# Patient Record
Sex: FEMALE | Race: BLACK OR AFRICAN AMERICAN | Employment: FULL TIME | URBAN - METROPOLITAN AREA
[De-identification: names, ages, dates, MRNs, and addresses within clinical notes are randomized per-mention and may not be internally consistent; named-entity substitution may affect disease eponyms.]

---

## 2017-04-21 ENCOUNTER — HOSPITAL ENCOUNTER (OUTPATIENT)
Dept: ULTRASOUND IMAGING | Age: 62
Discharge: HOME | End: 2017-04-21
Attending: NURSE PRACTITIONER
Payer: COMMERCIAL

## 2017-04-21 DIAGNOSIS — R31.29 MICROSCOPIC HEMATURIA: ICD-10-CM

## 2017-04-21 PROCEDURE — 76770 US EXAM ABDO BACK WALL COMP: CPT

## 2021-11-03 ENCOUNTER — APPOINTMENT (OUTPATIENT)
Dept: PHYSICAL THERAPY | Age: 66
End: 2021-11-03

## 2021-11-10 ENCOUNTER — HOSPITAL ENCOUNTER (OUTPATIENT)
Dept: PHYSICAL THERAPY | Age: 66
Discharge: HOME | End: 2021-11-10
Payer: COMMERCIAL

## 2021-11-10 PROCEDURE — 97110 THERAPEUTIC EXERCISES: CPT

## 2021-11-10 PROCEDURE — 97140 MANUAL THERAPY 1/> REGIONS: CPT

## 2021-11-10 PROCEDURE — 97162 PT EVAL MOD COMPLEX 30 MIN: CPT

## 2021-11-10 NOTE — PROGRESS NOTES
[address] [facility] AT [address], [address] [place] [address] Road  Phone: [phone]   [phone]  PLAN OF CARE / [address] [place] [address] PHYSICAL THERAPY SERVICES  Patient Name: Tevin Duvall : 1955   Medical   Diagnosis: Radiculopathy, lumbosacral region [M54.17] Treatment Diagnosis: Radiculopathy, lumbosacral region [M54.17]   Right hip pain  [M25.551]   Onset Date: ~2 months prior     Referral Source: Caryl Chavez MD Start of Care [facility]): 11/10/2021   Prior Hospitalization: See medical history Provider #: [id number]   Prior Level of Function: I in all functional mobility   Comorbidities: HTN   Medications: Verified on Patient Summary List   The Plan of Care and following information is based on the information from the initial evaluation.   ===========================================================================================  Assessment / key information:  Pt is a 77 y.o. F who presents with Lumbosacral radiculopathy as well as R hip pain likely due to piriformis syndrome. Current deficits include: dec hip and lumbar ROM, dec hip and core dynamic stability, dec hip strength, dec balance. Functional deficits include: impaired squatting, lifting, and bending. FOTO score indicates 39% functional impairment. Home exercise program initiated on initial evaluation to address these deficits. Pt would benefit from PT to address these deficits for increased functional mobility and QOL. ADRIEL: Pt reprots that her back started bothering her a over 2 months ago after mowing the lawn. She and her  were ill with covid back in April which she feels took a toll on her body. The pain began after pushing/pulling and turning with the . Initially the pain was only at her Right hip, then it progressed to the thigh, then the calf and foot.  [facility] and [facility] flexeril and methlyprednisone which helped but as soon as she stopped taking it the pain came back. [facility] and she was prescribed another round of steriods and robaxin which has again helped but has not resolved the pain. She was then sent to physical therapy. She describes the pain as a soreness at her hip, and she has numbness in her R big toe. The pain is worse with heavy lifting, squatting, and bending. It is also worse with heat. It is better with light activity. Prior to this injury she has not had any hx of low back or hip pain and was able to do all of her typical activities including yard work without pain, limitations, or problems. Pt denies all red flags. OBJECTIVE:  PAIN:  Location- low back, RLE  Current- 1/10  Best-0/10  Worst-9/10  Alleviating factors:light activity  Aggravating factors: heavy lifting, squatting, bending, heat    MMT:  Hip   -flex L=3+/5 R=3+/5  -ext L=3/5 R=3/5  -abd L=3+/5 R=3/5p! Knee  -flex L=3+/5 R=3+/5  -ext L=4/5 R=3+/5  Ankle  - DF L=4/5 R=3/5    Sensation: pt reports sensory deficits in L4 distribution along dorsal aspect of R great toe    Posture: grossly WNL    Balance: SLS 10s ARTEMIO with inc postural sway and hip IR with R SLS    AROM:  Lumbar  Flexion- finger tips to mid shin (75% ROM) tightness sensation  Extension- 50% ROM, mild pain  Rotation R- 75% ROM, tightness sensation, mild pain  Rotation L- 75% ROM, tightness sensation  Sidebend R- WNL  Sidebend L- 75% ROM, tightness sensation on R  Hip  Flexion- L=80deg, R=60deg p!   Extension- L=12deg, R= 0 deg  Piriformis (+) artemio for tightness R>L    Outcome Measure: FOTO=61    Palpation for Tenderness: TTP of R piriformis, SI joint, glute max, glute med, distal multifidus    PIVM: No reproduction of pain with P/A pressure from L1-5    Special Tests:  Supine to long sit- negative  SLR- positive on R  Slump Test- positive on R  Elys positive ARTEMIO R>L    ===========================================================================================  Eval Complexity: History MEDIUM  Complexity : 1-2 comorbidities / personal factors will impact the outcome/ POC ;  Examination  MEDIUM Complexity : 3 Standardized tests and measures addressing body structure, function, activity limitation and / or participation in recreation ; Presentation MEDIUM Complexity : Evolving with changing characteristics ; Decision Making MEDIUM Complexity : FOTO score of 26-74; Overall Complexity MEDIUM  Problem List: pain affecting function, decrease ROM, decrease strength, edema affecting function, impaired gait/ balance, decrease ADL/ functional abilitiies, decrease activity tolerance, decrease flexibility/ joint mobility and decrease transfer abilities   Treatment Plan may include any combination of the following: Therapeutic exercise, Therapeutic activities, Neuromuscular re-education, Physical agent/modality, Gait/balance training, Manual therapy, Patient education, Self Care training, Functional mobility training, Home safety training and Stair training  Patient / Family readiness to learn indicated by: asking questions  Persons(s) to be included in education: patient (P)  Barriers to Learning/Limitations: None  Measures taken, if barriers to learning:    Patient Goal (s): \"manage discomfort without medication\"   Patient self reported health status: good  Rehabilitation Potential: good  Short Term Goals: To be accomplished in 1 weeks:  1) Goal: Patient will be independent and compliant with HEP in order to progress toward long term goals. Status at last note/certification: issued and reviewed  Long Term Goals: To be accomplished in 8-12 treatments:  1) Goal: Patient will improve FOTO assessment score to 74 pts in order to indicate improved functional abilities.   Status at last note/certification: 61 pts  2) Goal: Patient will improve ARTEMIO hip extension to 20 deg for improved posture and gait mechanics  Status at last note/certification: [id number], R= 0 deg  3) Goal: Patient will report worst R hip/LE as 2/10 or less in order to progress toward personal goals. Status at last note/certification: 9/65  4) Goal: Patient will report overall at least 65% improvement in function in order to progress toward premorbid status. Status at last note/certification: n/a  5) Goal: Patient will demonstrate ability to squat to lift 20lbs with proper mechanics for improved ability to perform ADLs   Status at last note/certification: unable to squat or perform heavy lifting. Frequency / Duration:   Patient to be seen  1-2  times per week for 4-6  weeks: All LTG as above will be assessed and updated every 10 visits or 30 days and progressed as needed    Patient / Caregiver education and instruction: exercises  Therapist Signature: Rudolph Vale Date: 68/96/2146   Certification Period: na Time: 8:41 AM   ===========================================================================================  I certify that the above Physical Therapy Services are being furnished while the patient is under my care. I agree with the treatment plan and certify that this therapy is necessary. Physician Signature:        Date:       Time:        Tay Gonzalez MD  Please sign and return to [facility] AT [facility] or you may fax the signed copy to [phone]. Thank you.

## 2021-11-10 NOTE — PROGRESS NOTES
PT DAILY TREATMENT NOTE     Patient Name: Nato Watson  Date:11/10/2021  : 1955  [x]  Patient  Verified  Payor: BLUE CROSS / [facility] [place] [facility] / Product Type: PPO /    In time:8:35  Out time: 9:29a  Total Treatment Time (min): 54  Visit #: 1 of     Medicare/BCBS Only   Total Timed Codes (min):  23 1:1 Treatment Time:  54       Treatment Area: Radiculopathy, lumbosacral region [M54.17]    SUBJECTIVE  Pain Level (0-10 scale): 0  Any medication changes, allergies to medications, adverse drug reactions, diagnosis change, or new procedure performed?: [x] No    [] Yes (see summary sheet for update)  Subjective functional status/changes:   [] No changes reported  Pt initial eval see POC for details    OBJECTIVE    31 min [x]Eval                  []Re-Eval       10 min Therapeutic Exercise:  [] See flow sheet :   Rationale: increase ROM and increase strength to improve the patient's ability to squat    13 min Manual Therapy:  stm to R piriformis, manual hip flexor, ER/IR stretching   The manual therapy interventions were performed at a separate and distinct time from the therapeutic activities interventions. Rationale: decrease pain, increase ROM and increase tissue extensibility to improve tissue excursion during functional mobility and ADLs              With   [] TE   [] TA   [] neuro   [] other: Patient Education: [x] Review HEP    [] Progressed/Changed HEP based on:   [] positioning   [] body mechanics   [] transfers   [] heat/ice application    [] other:      Other Objective/Functional Measures:    Justification for Eval Code Complexity:  Patient History : see POC   Examination see exam   Clinical Presentation: evolving  Clinical Decision Making : FOTO : 61/100    Pain Level (0-10 scale) post treatment: 0    ASSESSMENT/Changes in Function: Pt was instructed in initial HEP required demo, vc, and tc for all exercises to perform correctly.  Pt was given hand out detailing exercises and instructed in modification of exercises to tolerance, and in performing exercises safely. Pt verbalized understanding. Patient will continue to benefit from skilled PT services to modify and progress therapeutic interventions, address functional mobility deficits, address ROM deficits, address strength deficits, analyze and address soft tissue restrictions, analyze and cue movement patterns, analyze and modify body mechanics/ergonomics, assess and modify postural abnormalities, address imbalance/dizziness and instruct in home and community integration to attain remaining goals. []  See Plan of Care  []  See progress note/recertification  []  See Discharge Summary         Progress towards goals / Updated goals:  No progress as goals were set today    PLAN  []  Upgrade activities as tolerated     []  Continue plan of care  []  Update interventions per flow sheet       []  Discharge due to:_  []  Other:_        Arturo Florez 11/10/2021  9:08 AM    No future appointments.

## 2021-11-11 ENCOUNTER — HOSPITAL ENCOUNTER (OUTPATIENT)
Dept: PHYSICAL THERAPY | Age: 66
End: 2021-11-11
Payer: COMMERCIAL

## 2021-11-15 ENCOUNTER — APPOINTMENT (OUTPATIENT)
Dept: PHYSICAL THERAPY | Age: 66
End: 2021-11-15
Payer: COMMERCIAL

## 2021-11-17 ENCOUNTER — HOSPITAL ENCOUNTER (OUTPATIENT)
Dept: PHYSICAL THERAPY | Age: 66
Discharge: HOME | End: 2021-11-17
Payer: COMMERCIAL

## 2021-11-17 PROCEDURE — 97140 MANUAL THERAPY 1/> REGIONS: CPT

## 2021-11-17 PROCEDURE — 97110 THERAPEUTIC EXERCISES: CPT

## 2021-11-17 NOTE — PROGRESS NOTES
PHYSICAL THERAPY - DAILY TREATMENT NOTE    Patient Name: Alexandr Arauz        Date: 2021  : 1955   yes Patient  Verified  Visit #:   2     Insurance: Payor: Arelis Hernández / Plan: [place] PART A & B / Product Type: Medicare /      In time: 7:00 Out time: 7:55   Total Treatment Time: 55     Medicare/BCBS Time Tracking (below)   Total Timed Codes (min):  51 1:1 Treatment Time:  51     TREATMENT AREA =  Radiculopathy, lumbosacral region [M54.17]    SUBJECTIVE  Pain Level (on 0 to 10 scale):  0  / 10   Medication Changes/New allergies or changes in medical history, any new surgeries or procedures?    no  If yes, update Summary List   Subjective Functional Status/Changes:  []  No changes reported     Pt reports gradual reduction in pain recently. She is no longer taking pain medication. She has only minimal, intermittent pain or numbness to RLE/toe. Pain tends to hit to R low back/buttock        OBJECTIVE    27 min Therapeutic Exercise:  [x]  See flow sheet   Rationale:    increase ROM and increase strength to improve the patient's ability to squat    10 min Manual Therapy: stm to R piriformis, R>L l/s paraspinals with DTM to L5 multifidus. manual quad, ER stretching   Rationale:      decrease pain, increase ROM and increase tissue extensibility to improve tissue excursion during functional mobility and ADLs  The manual therapy interventions were performed at a separate and distinct time from the therapeutic activities interventions.     7 min Therapeutic Activity: [x]  See flow sheet  -bridge, side stepping   Rationale:   to improve functional activities, model real life movements and performance specific to the patient's need with supervision to return the patient to their PLOF       7 min Neuromuscular Re-ed: [x]  See flow sheet  -pallof   Rationale:    improve coordination, improve balance and increase proprioception to improve the patients ability to dynamically stabilize l/s with functional mobility and ADL's      Billed With/As:   [x] TE   [] TA   [] Neuro   [] Self Care Patient Education: [x] Review HEP    [] Progressed/Changed HEP based on:   [] positioning   [] body mechanics   [] transfers   [] heat/ice application    [x] other: updated HEP     Other Objective/Functional Measures:    -new exercises added as per flow sheet with vc's provided for form/technique 100% of the time. -cues to increase hip excursion with supine bridge; pt denies pain; limited to 50% 2/2 weakness and notes \"tight\" to b/l quads. Post Treatment Pain Level (on 0 to 10) scale:   0  / 10     ASSESSMENT  Assessment/Changes in Function:     Pt noted to have mild/mod tightness to b/l quad/psoas with dale stretching. Pt reports min increase in R great toe numbness after treatment; possibly related to piriformis manual therapy as she did not note any peripheralization with exercises. She did have a lot of \"soreness\" with STM to R L5 multifidus. Will assess treatment response NV with appropriate modifications/adjustments as indicated. Pt ed on need for compliance with HEP for progressive core strengthening. []  See Progress Note/Recertification   Patient will continue to benefit from skilled PT services to modify and progress therapeutic interventions, address functional mobility deficits, address ROM deficits, address strength deficits, analyze and address soft tissue restrictions, analyze and cue movement patterns, analyze and modify body mechanics/ergonomics, assess and modify postural abnormalities, address imbalance/dizziness and instruct in home and community integration to attain remaining goals. Progress toward goals / Updated goals:    Short Term Goals: To be accomplished in 1 weeks:  1) Goal: Patient will be independent and compliant with HEP in order to progress toward long term goals. Status at last note/certification: issued and reviewed. -11/17: goal ongoing; performing ~4 x/wk.   Updated HEP today  Long Term Goals: To be accomplished in 8-12 treatments:  1) Goal: Patient will improve FOTO assessment score to 74 pts in order to indicate improved functional abilities. Status at last note/certification: 61 pts  2) Goal: Patient will improve ARTEMIO hip extension to 20 deg for improved posture and gait mechanics  Status at last note/certification: [id number], R= 0 deg  3) Goal: Patient will report worst R hip/LE as 2/10 or less in order to progress toward personal goals. Status at last note/certification: 2/37  4) Goal: Patient will report overall at least 65% improvement in function in order to progress toward premorbid status. Status at last note/certification: n/a  5) Goal: Patient will demonstrate ability to squat to lift 20lbs with proper mechanics for improved ability to perform ADLs   Status at last note/certification: unable to squat or perform heavy lifting.         PLAN  []  Upgrade activities as tolerated yes Continue plan of care   []  Discharge due to :    []  Other:      Therapist: Satinder Yuen.  Luz Juarez, PT    Date: 11/17/2021 Time: 6:57 AM     Future Appointments   Date Time Provider Parish Aguero   11/22/2021  7:00 AM Rivera Hogan, PT [facility]   11/30/2021  3:30 PM Louise Abdul PTA [facility]

## 2021-11-22 ENCOUNTER — APPOINTMENT (OUTPATIENT)
Dept: PHYSICAL THERAPY | Age: 66
End: 2021-11-22
Payer: COMMERCIAL

## 2021-11-24 ENCOUNTER — APPOINTMENT (OUTPATIENT)
Dept: PHYSICAL THERAPY | Age: 66
End: 2021-11-24
Payer: COMMERCIAL

## 2021-11-24 ENCOUNTER — HOSPITAL ENCOUNTER (OUTPATIENT)
Dept: PHYSICAL THERAPY | Age: 66
Discharge: HOME | End: 2021-11-24
Payer: COMMERCIAL

## 2021-11-24 PROCEDURE — 97110 THERAPEUTIC EXERCISES: CPT

## 2021-11-24 PROCEDURE — 97140 MANUAL THERAPY 1/> REGIONS: CPT

## 2021-11-24 NOTE — PROGRESS NOTES
PHYSICAL THERAPY - DAILY TREATMENT NOTE    Patient Name: Shauna Munoz        Date: 2021  : 1955   yes Patient  Verified  Visit #:   4     [facility] / Plan: [place] PART A & B / Product Type: Medicare /      In time: 6:59 Out time: 7:58   Total Treatment Time: 59     Medicare/BCBS Time Tracking (below)   Total Timed Codes (min):  55 1:1 Treatment Time:  55     TREATMENT AREA =  Radiculopathy, lumbosacral region [M54.17]    SUBJECTIVE  Pain Level (on 0 to 10 scale):  0  / 10   Medication Changes/New allergies or changes in medical history, any new surgeries or procedures?    no  If yes, update Summary List   Subjective Functional Status/Changes:  []  No changes reported     Pt states she had increased soreness after the manual therapy last session, but then she felt a little better after it wore off. She reports her numbness to R great toe is better as it is localized to just the great toe whereas before it had included 1st MT.       OBJECTIVE    45 min Therapeutic Exercise:  [x]  See flow sheet (-4 min TM)   Rationale:    increase ROM and increase strength to improve the patient's ability to squat    10 min Manual Therapy: stm to R piriformis, R>L l/s paraspinals with DTM to L5 multifidus. manual quad, ER stretching   Rationale:      decrease pain, increase ROM and increase tissue extensibility to improve tissue excursion during functional mobility and ADLs  The manual therapy interventions were performed at a separate and distinct time from the therapeutic activities interventions.         Billed With/As:   [x] TE   [] TA   [] Neuro   [] Self Care Patient Education: [x] Review HEP    [] Progressed/Changed HEP based on:   [] positioning   [] body mechanics   [] transfers   [] heat/ice application    [x] other: updated HEP     Other Objective/Functional Measures:    -increased reps with clams, s/l abd  -progressed TA march to   -added modified side planks  -performed fig 4 bridges x 5 reps no issues reported   Post Treatment Pain Level (on 0 to 10) scale:   0  / 10     ASSESSMENT  Assessment/Changes in Function:     Pt demonstrates good improvement in pain as per LTG #3 met today. Pt also demonstrating slow, steady increase in glute/core strength as per ability to advance with reps/exercises as per flow sheet. Pt did note increased N/T to R great toe after s/l hip abd; ed to take rest between sets. Plan to continue progressing hip/core strength for decreased pain/radicular sx's RLE. []  See Progress Note/Recertification   Patient will continue to benefit from skilled PT services to modify and progress therapeutic interventions, address functional mobility deficits, address ROM deficits, address strength deficits, analyze and address soft tissue restrictions, analyze and cue movement patterns, analyze and modify body mechanics/ergonomics, assess and modify postural abnormalities, address imbalance/dizziness and instruct in home and community integration to attain remaining goals. Progress toward goals / Updated goals:    Short Term Goals: To be accomplished in 1 weeks:  1) Goal: Patient will be independent and compliant with HEP in order to progress toward long term goals. Status at last note/certification: issued and reviewed. -11/17: goal ongoing; performing ~4 x/wk. Updated HEP today    [address] be accomplished in 8-12 treatments:  1) Goal: Patient will improve FOTO assessment score to 74 pts in order to indicate improved functional abilities. Status at last note/certification: 61 pts  2) Goal: Patient will improve ARTEMIO hip extension to 20 deg for improved posture and gait mechanics  Status at last note/certification: [id number], R= 0 deg  3) Goal: Patient will report worst R hip/LE as 2/10 or less in order to progress toward personal goals.   Status at last note/certification: 5/30. -11/24: goal met; pt reports max pain 0/10  4) Goal: Patient will report overall at least 65% improvement in function in order to progress toward premorbid status. Status at last note/certification: n/a  5) Goal: Patient will demonstrate ability to squat to lift 20lbs with proper mechanics for improved ability to perform ADLs   Status at last note/certification: unable to squat or perform heavy lifting.         PLAN  []  Upgrade activities as tolerated yes Continue plan of care   []  Discharge due to :    []  Other:      Therapist: Roberto Brown.  Rogelio Moreno, PT    Date: 11/24/2021 Time: 8:17 AM      Future Appointments   Date Time Provider Parish Aguero   11/30/2021  2:45 PM Domenica Alfaro [facility]

## 2021-11-30 ENCOUNTER — APPOINTMENT (OUTPATIENT)
Dept: PHYSICAL THERAPY | Age: 66
End: 2021-11-30
Payer: COMMERCIAL

## 2021-12-02 ENCOUNTER — HOSPITAL ENCOUNTER (OUTPATIENT)
Dept: PHYSICAL THERAPY | Age: 66
Discharge: HOME | End: 2021-12-02
Payer: COMMERCIAL

## 2021-12-02 PROCEDURE — 97110 THERAPEUTIC EXERCISES: CPT

## 2021-12-02 PROCEDURE — 97140 MANUAL THERAPY 1/> REGIONS: CPT

## 2021-12-02 NOTE — PROGRESS NOTES
PHYSICAL THERAPY - DAILY TREATMENT NOTE    Patient Name: Nato Watson        Date: 2021  : 1955   yes Patient  Verified  Visit #:   4     Insurance: Payor: Falguni Hazel / Plan: [place] / Product Type: Medicare /      In time: 7:00 Out time: 7:45   Total Treatment Time: 45     Medicare/BCBS Time Tracking (below)   Total Timed Codes (min):  45 1:1 Treatment Time:  45     TREATMENT AREA =  Radiculopathy, lumbosacral region [M54.17]    SUBJECTIVE  Pain Level (on 0 to 10 scale):  0  / 10   Medication Changes/New allergies or changes in medical history, any new surgeries or procedures?    no  If yes, update Summary List   Subjective Functional Status/Changes:  []  No changes reported     I think that I am getting better, I mainly just feel a little bit of tenderness in my buttock and some numbness in my big toe. OBJECTIVE    33 1:1 min Therapeutic Exercise:  [x]  See flow sheet   Rationale:      increase ROM and increase strength to improve the patients ability to improve functional abilities      12 1:1 min Manual Therapy: Long axis R LE distraction, Instrument assisted soft tissue mobilization applied to R glut/posterolateral hip using GT-1 and manual piriformis stretching   Rationale:      decrease pain, increase ROM, increase tissue extensibility, decrease trigger points and increase postural awareness to improve patient's ability to improve functional mobility  The manual therapy interventions were performed at a separate and distinct time from the therapeutic activities interventions.     Billed With/As:   [] TE   [] TA   [] Neuro   [] Self Care Patient Education: [x] Review HEP    [] Progressed/Changed HEP based on:   [] positioning   [] body mechanics   [] transfers   [] heat/ice application    [] other:      Other Objective/Functional Measures:  Increased to 2 laps with mini band side steps and extensive cueing/demonstration for proper technique with various exercises today     Post Treatment Pain Level (on 0 to 10) scale:   0  / 10     ASSESSMENT  Assessment/Changes in Function:   Patient reports approximately 75% overall improvement from therapy since initial evaluation. Pt also presents with diminished symptoms with only c/o R glut tenderness as well as isolated great toe numbness/paresthesia since last treatment. Pt reported R glut tenderness and great toe numbness abolished after trial with [facility] today. Will continue to progress/advance patient within current POC as tolerated with monitoring symptoms. []  See Progress Note/Recertification   Patient will continue to benefit from skilled PT services to modify and progress therapeutic interventions, address functional mobility deficits, address ROM deficits, address strength deficits, analyze and address soft tissue restrictions, analyze and cue movement patterns, analyze and modify body mechanics/ergonomics, assess and modify postural abnormalities and instruct in home and community integration to attain remaining goals. Progress toward goals / Updated goals:  Short Term Goals: To be accomplished in 1 weeks:  1) Goal: Patient will be independent and compliant with HEP in order to progress toward long term goals. Status at last note/certification: issued and reviewed. -11/17: goal ongoing; performing ~4 x/wk. Updated HEP today     Long Term Goals: To be accomplished in 8-12 treatments:  1) Goal: Patient will improve FOTO assessment score to 74 pts in order to indicate improved functional abilities. Status at last note/certification: 22 EFO  2) Goal: Patient will improve ARTEMIO hip extension to 20 deg for improved posture and gait mechanics  Status at last note/certification: L=12deg, R= 0 deg  3) Goal: Patient will report worst R hip/LE as 2/10 or less in order to progress toward personal goals.   Status at last note/certification: 9/10. -11/24: goal met; pt reports max pain 0/10  4) Goal: Patient will report overall at least 65% improvement in function in order to progress toward premorbid status. 12/2/21: Met: Pt reports approximately 75% overall improvement since initial evaluation  Status at last note/certification: n/a  5) Goal: Patient will demonstrate ability to squat to lift 20lbs with proper mechanics for improved ability to perform ADLs   Status at last note/certification: unable to squat or perform heavy lifting.      PLAN  [x]  Upgrade activities as tolerated yes Continue plan of care   []  Discharge due to :    []  Other:      Therapist: Jason Keith, PTA    Date: 12/2/2021 Time: 6:59 AM     Future Appointments   Date Time Provider Parish Aguero   12/2/2021  7:00 AM Camara Lunch, PTA [facility]

## 2021-12-10 ENCOUNTER — HOSPITAL ENCOUNTER (OUTPATIENT)
Dept: PHYSICAL THERAPY | Age: 66
Discharge: HOME | End: 2021-12-10
Payer: COMMERCIAL

## 2021-12-10 PROCEDURE — 97140 MANUAL THERAPY 1/> REGIONS: CPT

## 2021-12-10 PROCEDURE — 97110 THERAPEUTIC EXERCISES: CPT

## 2021-12-10 NOTE — PROGRESS NOTES
PHYSICAL THERAPY - DAILY TREATMENT NOTE    Patient Name: Felicita Bridges        Date: 12/10/2021  : 1955   yes Patient  Verified  Visit #:   5   of     Insurance: Payor: Winifred Connelly / Plan: [place] MEDICARE PART A & B / Product Type: Medicare /      In time: 7:00 Out time: 7:46   Total Treatment Time: 46     Medicare/BCBS Time Tracking (below)   Total Timed Codes (min):  46 1:1 Treatment Time:  46     TREATMENT AREA =  Radiculopathy, lumbosacral region [M54.17]    SUBJECTIVE  Pain Level (on 0 to 10 scale):  0 / 10   Medication Changes/New allergies or changes in medical history, any new surgeries or procedures? yes  If yes, update Summary List   Subjective Functional Status/Changes:  []  No changes reported     My buttock and leg felt a lot better after you worked on me with those instruments the last time I was here, I think that did the trick. OBJECTIVE    38  1:1 min Therapeutic Exercise:  [x]  See flow sheet   Rationale:      increase ROM and increase strength to improve the patients ability to improve functional abilities     8  1:1 min Manual Therapy: Long axis R LE distraction, Instrument assisted soft tissue mobilization applied to R glut/posterolateral hip using GT-1   Rationale:      decrease pain, increase ROM, increase tissue extensibility, decrease trigger points and increase postural awareness to improve patient's ability to improve functional mobility   The manual therapy interventions were performed at a separate and distinct time from the therapeutic activities interventions.       Billed With/As:   [] TE   [] TA   [] Neuro   [] Self Care Patient Education: [x] Review HEP    [] Progressed/Changed HEP based on:   [] positioning   [] body mechanics   [] transfers   [] heat/ice application    [] other:      Other Objective/Functional Measures:       Post Treatment Pain Level (on 0 to 10) scale:   0  / 10     ASSESSMENT  Assessment/Changes in Function:   See Progress note/Physician update for full detailed progress towards established goals. [x]  See Progress Note/Recertification   Patient will continue to benefit from skilled PT services to modify and progress therapeutic interventions, address functional mobility deficits, address ROM deficits, address strength deficits, analyze and address soft tissue restrictions, analyze and cue movement patterns, analyze and modify body mechanics/ergonomics, assess and modify postural abnormalities and instruct in home and community integration to attain remaining goals. Progress toward goals / Updated goals:  See Progress note/Physician update for full detailed progress towards established goals.      PLAN  [x]  Upgrade activities as tolerated yes Continue plan of care   []  Discharge due to :    []  Other:      Therapist: Jona Veloz PTA    Date: 12/10/2021 Time: 7:00 AM     Future Appointments   Date Time Provider Parish Aguero   12/17/2021  7:00 AM Peter Bourgeois, HEIDI [facility]   12/22/2021  7:00 AM Irina Adkins, PT [facility]   12/29/2021  7:00 AM Devonte Becerra, PT [facility]

## 2021-12-10 NOTE — PROGRESS NOTES
[address] [facility] PHYSICAL THERAPY  [address], [facility], [address] - Phone: [phone]  Fax: [phone] OF CARE/RECERTIFICATION FOR PHYSICAL THERAPY          Patient Name: Sarwat Clayton : 1955   Treatment/Medical Diagnosis: Radiculopathy, lumbosacral region [M54.17]   Onset Date: ~2 months prior    Referral Source: Scott Villanueva MD Start of Care [facility]): 11/10/2021   Prior Hospitalization: See Medical History Provider #: [id number]   Prior Level of Function: I in all functional mobility   Comorbidities: HTN   Medications: Verified on Patient Summary List   Vis[facility] from [facility]: 5 Missed Visits: 0     Goal/Measure of Progress Goal Met? 1. Patient will be independent and compliant with HEP in order to progress toward long term goals. Status at last Eval: issued and reviewed Current Status: Met, pt reports independence and compliance with current HEP 1x/day yes   2. Patient will improve FOTO assessment score to 74 pts in order to indicate improved functional abilities. Status at last Eval: 61/100 Current Status: 63/100 progress   3. Patient will improve ARTEMIO hip extension to 20 deg for improved posture and gait mechanics   Status at last Eval:  L=12deg, R= 0 deg Current Status:  L=12deg, R= 8 deg progress     Goal/Measure of Progress Goal Met? 4. Patient will report worst R hip/LE as 2/10 or less in order to progress toward personal goals   Status at last Eval: 9/10 Current Status: 2/10 yes   5. Patient will report overall at least 65% improvement in function in order to progress toward premorbid status. Status at last Eval: n/a Current Status: 75% improvement reported yes   6. Patient will demonstrate ability to squat to lift 20lbs with proper mechanics for improved ability to perform ADLs    Status at last Eval: unable to squat or perform heavy lifting.   Current Status: Met with ability to demonstrate squat crate lifts from floor to bed with 20 lb crate yes     Key Functional Changes/Progress: Pt reports 75% overall improvement in symptoms since beginning care. Pt reports 2/10 max pain, 1-2/10 avg pain; notes pain is made worse with repetitive bending and heavy lifting type ADL's. Pt is making steady progress gaining LE/hip mobility as well as advancing with strengthening and lumbopelvic/core stabilization within current POC. These improvements are likely contributing to pt self reported functional improvements as well as improved FOTO score. Although pt is progressing, she has not yet met all long term goals. As such skilled care is justified in continuing. Improvements: Pt reports the most functional improvement with the ability to somewhat self manage symptoms with HEP as well as decreased intensity and frequency of pain/symptoms since initial evaluation. Remaining functional limitations: Increased limitations/pain/symptoms with repetitive bending and heavy lifting type ADL's. Objective measurements:  R hip strength measurements/MMT= Flexion= 4-/5; Abduction= 4/5; Extension= 3+/5  Hip Extension AROM= L=12 deg, R= 8 deg    FOTO 63/100    Problem List: pain affecting function, decrease ROM, decrease strength, impaired gait/ balance, decrease ADL/ functional abilitiies, decrease activity tolerance, decrease flexibility/ joint mobility and decrease transfer abilities     Treatment Plan may include any combination of the following: Therapeutic exercise, Therapeutic activities, Neuromuscular re-education, Physical agent/modality, Gait/balance training, Manual therapy, Patient education, Self Care training, Functional mobility training, Home safety training and Stair training  Patient Goal(s) has been updated and includes:  \"I want to be able to bend over and lift with less pain in my butt and hip. \"    Goals for this certification period include and are to be achieved in   7  treatments: 1. Patient will improve FOTO assessment score to 74 pts in order to indicate improved functional abilities. 2.Patient will improve ARTEMIO hip extension to 20 deg for improved posture and gait mechanics  3. Increase R hip strength by =/> 1/3 muscle grade with all measured planes for increased strength and endurance with standing/walking ADL's.   4. Pt will report =/> 75% reduction in pain/symptoms with repetitive bending and heavy lifting type ADL's for increased function with ADL's. Frequency / Duration:   Patient to be seen   1-2   times per week for   7    treatments:    Assessments/Recommendations: Continue with current POC for 7 remaining visits left on current script with advancing as tolerated, then reassess for the need for continuation or discharge from therapy. If you have any questions/comments please contact us directly at [phone]. Thank you for allowing us to assist in the care of your patient. Therapist Signature: HEIDI Ricci DPT Date: 25/23/9559   Certification Period:   NA Time: 7:04 AM   NOTE TO PHYSICIAN:  PLEASE COMPLETE THE ORDERS BELOW AND FAX TO   [facility]: [phone]  If you are unable to process this request in 24 hours please contact our office: [phone]    ___ I have read the above report and request that my patient continue as recommended.   ___ I have read the above report and request that my patient continue therapy with the following changes/special instructions: ________________________________________________   ___ I have read the above report and request that my patient be discharged from therapy.      Physician Signature:        Date:       Time:       Luís Mehta MD

## 2021-12-17 ENCOUNTER — HOSPITAL ENCOUNTER (OUTPATIENT)
Dept: PHYSICAL THERAPY | Age: 66
Discharge: HOME | End: 2021-12-17
Payer: COMMERCIAL

## 2021-12-17 PROCEDURE — 97140 MANUAL THERAPY 1/> REGIONS: CPT

## 2021-12-17 PROCEDURE — 97110 THERAPEUTIC EXERCISES: CPT

## 2021-12-17 NOTE — PROGRESS NOTES
PHYSICAL THERAPY - DAILY TREATMENT NOTE    Patient Name: Byron Room        Date: 2021  : 1955   yes Patient  Verified  Visit #:     Insurance: Payor: Akua Mccain / [address]: [address] / Product Type: PPO /      In time: 7:04 Out time: 7:45   Total Treatment Time: 41     Medicare/BCBS Time Tracking (below)   Total Timed Codes (min):  41 1:1 Treatment Time:  41     TREATMENT AREA =  Radiculopathy, lumbosacral region [M54.17]    SUBJECTIVE  Pain Level (on 0 to 10 scale):  0  / 10   Medication Changes/New allergies or changes in medical history, any new surgeries or procedures?    no  If yes, update Summary List   Subjective Functional Status/Changes:  []  No changes reported     My buttock and hip has been feeling really good lately, I just feel a little but of soreness in that muscle where you have been working on it and I feel it a little bit if I do a lot of bending over. OBJECTIVE    32  1:1 min Therapeutic Exercise:  [x]  See flow sheet   Rationale:      increase ROM and increase strength to improve the patients ability to improve functional abilities      9  1:1 min Manual Therapy: Long axis R LE distraction, Instrument assisted soft tissue mobilization applied to R glut/posterolateral hip using GT-1   Rationale:      decrease pain, increase ROM, increase tissue extensibility, decrease trigger points and increase postural awareness to improve patient's ability to improve functional mobility   The manual therapy interventions were performed at a separate and distinct time from the therapeutic activities interventions.     Billed With/As:   [] TE   [] TA   [] Neuro   [] Self Care Patient Education: [x] Review HEP    [] Progressed/Changed HEP based on:   [] positioning   [] body mechanics   [] transfers   [] heat/ice application    [] other:      Other Objective/Functional Measures:  Increased resistance with mini band side steps, advanced to level 2 dead bug stabs and addition of seated stability ball stabs and marches     Post Treatment Pain Level (on 0 to 10) scale:   0  / 10     ASSESSMENT  Assessment/Changes in Function:   Pt presents with minimal reoccurrence of symptoms since last treatment and was able to advance to increased resistance with mini band side steps, advanced to level 2 dead bug stabs and addition of seated stability ball stabs and marches with min to mod challenge today. Will continue to progress/advance patient within current POC as tolerated with monitoring symptoms. []  See Progress Note/Recertification   Patient will continue to benefit from skilled PT services to modify and progress therapeutic interventions, address functional mobility deficits, address ROM deficits, address strength deficits, analyze and address soft tissue restrictions, analyze and cue movement patterns, analyze and modify body mechanics/ergonomics, assess and modify postural abnormalities and instruct in home and community integration to attain remaining goals. Progress toward goals / Updated goals:  · Goals for this certification period include and are to be achieved in   7  treatments: 1. Patient will improve FOTO assessment score to 74 pts in order to indicate improved functional abilities. 2.Patient will improve ARTEMIO hip extension to 20 deg for improved posture and gait mechanics  3. Increase R hip strength by =/> 1/3 muscle grade with all measured planes for increased strength and endurance with standing/walking ADL's.   4. Pt will report =/> 75% reduction in pain/symptoms with repetitive bending and heavy lifting type ADL's for increased function with ADL's.       PLAN  [x]  Upgrade activities as tolerated yes Continue plan of care   []  Discharge due to :    []  Other:      Therapist: Hebert Alexander PTA    Date: 12/17/2021 Time: 7:06 AM     Future Appointments   Date Time Provider Parish Aguero   12/22/2021  7:00 AM Bc Morgna PT [facility]   12/29/2021  7:00 AM Candace Phillips, PT [facility]

## 2021-12-22 ENCOUNTER — HOSPITAL ENCOUNTER (OUTPATIENT)
Dept: PHYSICAL THERAPY | Age: 66
Discharge: HOME | End: 2021-12-22
Payer: COMMERCIAL

## 2021-12-22 PROCEDURE — 97110 THERAPEUTIC EXERCISES: CPT | Performed by: GENERAL ACUTE CARE HOSPITAL

## 2021-12-22 PROCEDURE — 97140 MANUAL THERAPY 1/> REGIONS: CPT | Performed by: GENERAL ACUTE CARE HOSPITAL

## 2021-12-22 NOTE — PROGRESS NOTES
PHYSICAL THERAPY - DAILY TREATMENT NOTE    Patient Name: Chip Done        Date: 2021  : 1955   yes Patient  Verified  Visit #:   7     Insurance: Payor: Bkabhayreggie Eaton / Plan: [id number] / Product Type: PPO /      In time: 6:59 Out time: 7:43    Total Treatment Time: 44     Medicare/BCBS Time Tracking (below)   Total Timed Codes (min):  44 1:1 Treatment Time:  40     TREATMENT AREA =  Radiculopathy, lumbosacral region [M54.17]    SUBJECTIVE  Pain Level (on 0 to 10 scale):  0  / 10    Medication Changes/New allergies or changes in medical history, any new surgeries or procedures?    no  If yes, update Summary List   Subjective Functional Status/Changes:  []  No changes reported     Pt reports her symptoms are continuing to improve, only having a little numbness in her R great toe. OBJECTIVE    32/36   min Therapeutic Exercise:  [x]  See flow sheet: NC for TM warm up    Rationale:      increase ROM and increase strength to improve the patients ability to improve functional abilities      8 min Manual Therapy: Long axis R LE distraction, Instrument assisted soft tissue mobilization applied to R glut/posterolateral hip using GT-1   Rationale:      decrease pain, increase ROM, increase tissue extensibility, decrease trigger points and increase postural awareness to improve patient's ability to improve functional mobility   The manual therapy interventions were performed at a separate and distinct time from the therapeutic activities interventions. Billed With/As:   [] TE   [] TA   [] Neuro   [] Self Care Patient Education: [x] Review HEP    [] Progressed/Changed HEP based on:   [] positioning   [] body mechanics   [] transfers   [] heat/ice application    [] other:      Other Objective/Functional Measures:  Continued progressions from last session   Added peach TB with SL clams and bridges   Reinitiated mod.  Side plank (on knees)      Post Treatment Pain Level (on 0 to 10) scale:   0  / 10 \"sore\"     ASSESSMENT  Assessment/Changes in Function:   Pt progressing well towards goals at this time, as indicated by tolerance of exercises progressions and maintaining low symptom provocation. +ttp R glut and piriformis during manual. Will continue to progress/advance patient within current POC as tolerated with monitoring symptoms. []  See Progress Note/Recertification   Patient will continue to benefit from skilled PT services to modify and progress therapeutic interventions, address functional mobility deficits, address ROM deficits, address strength deficits, analyze and address soft tissue restrictions, analyze and cue movement patterns, analyze and modify body mechanics/ergonomics, assess and modify postural abnormalities and instruct in home and community integration to attain remaining goals. Progress toward goals / Updated goals:  · Goals for this certification period include and are to be achieved in   7  treatments: 1. Patient will improve FOTO assessment score to 74 pts in order to indicate improved functional abilities. 2.Patient will improve ARTEMIO hip extension to 20 deg for improved posture and gait mechanics  3. Increase R hip strength by =/> 1/3 muscle grade with all measured planes for increased strength and endurance with standing/walking ADL's. Added resistance with clams for hip abd/ER strengthening (12/22/21)  4. Pt will report =/> 75% reduction in pain/symptoms with repetitive bending and heavy lifting type ADL's for increased function with ADL's.  Current: pt reports significant improvement in symptoms with minimal pain and only slight numbness in R GT (12/22/21)     PLAN  [x]  Upgrade activities as tolerated yes Continue plan of care   []  Discharge due to :    []  Other:      Therapist: Kelsi Patterson PT    Date: 12/22/2021 Time: 7:06 AM     Future Appointments   Date Time Provider Parish Aguero   12/22/2021  7:00 AM Martell Batista PT [facility]   12/29/2021 7:00 AM Svitlana Workman., PT [facility]

## 2021-12-29 ENCOUNTER — HOSPITAL ENCOUNTER (OUTPATIENT)
Dept: PHYSICAL THERAPY | Age: 66
Discharge: HOME | End: 2021-12-29
Payer: COMMERCIAL

## 2021-12-29 PROCEDURE — 97110 THERAPEUTIC EXERCISES: CPT

## 2021-12-29 PROCEDURE — 97140 MANUAL THERAPY 1/> REGIONS: CPT

## 2021-12-29 NOTE — PROGRESS NOTES
PHYSICAL THERAPY - DAILY TREATMENT NOTE    Patient Name: Elsa Hire        Date: 2021  : 1955   yes Patient  Verified  Visit #:      12  Insurance: Payor: Albertha Osler / Plan: Nathalie Méndez / Product Type: PPO /      In time: 6:59 Out time: 7:57   Total Treatment Time: 58     Medicare/[facility] Time Tracking (below)   Total Timed Codes (min):  54 1:1 Treatment Time:  54     TREATMENT AREA =  Radiculopathy, lumbosacral region [M54.17]    SUBJECTIVE  Pain Level (on 0 to 10 scale):  2  / 10 - \"sore\"   Medication Changes/New allergies or changes in medical history, any new surgeries or procedures?    no  If yes, update Summary List   Subjective Functional Status/Changes:  []  No changes reported     Pt reports increased right posterior hip/low back \"soreness\" since helping  move in a new deep freezer on [address] 6 days ago. She reports continuing with her HEP. States the toe numbness is getting better. OBJECTIVE    40   min Therapeutic Exercise:  [x]  See flow sheet:  (-4 min TM)   Rationale:      increase ROM and increase strength to improve the patients ability to improve functional abilities      14 min Manual Therapy: STM R>L lower l/s paraspinals and upper glutes. METs for pubic clearing and R anteriorly rotated innominate (R glute, L psoas). Rationale:      decrease pain, increase ROM, increase tissue extensibility, decrease trigger points and increase postural awareness to improve patient's ability to improve functional mobility   The manual therapy interventions were performed at a separate and distinct time from the therapeutic activities interventions. Billed With/As:   [x] TE   [] TA   [] Neuro   [] Self Care Patient Education: [x] Review HEP    [] Progressed/Changed HEP based on:   [] positioning   [] body mechanics   [] transfers   [] heat/ice application    [] other:      Other Objective/Functional Measures:  SI check: R anteriorly rotated innominate. -pt unable to maintain previous velocity on TM 2/2 R hip soreness (max speed 2.0 vs previously 2.5 mph); regressed laps with band side steps  -added squat to table for progressive strength towards lifting  -increased reps with clams, alternated with modified side planks  -bridge 25-50% with cues to increase excursion  -pt ed on self correction for R anteriorly rotated innominate with dowel; issued HEP handout and instructed to perform PRN; see chart copy     Post Treatment Pain Level (on 0 to 10) scale:   1.5  / 10      ASSESSMENT  Assessment/Changes in Function:   Pt with R anteriorly rotated innominate; improved ~80% after manual treatment and pt instructed on self correction for carryover to HEP as needed. Pt noted to have increased wear to R>L lateral heel indicating chronicity of SI joint dysfunction. Will reassess pelvic alignment NV and provide appropriate manual interventions. Continue progression of core strength for long term stability and reduced LBP. []  See Progress Note/Recertification   Patient will continue to benefit from skilled PT services to modify and progress therapeutic interventions, address functional mobility deficits, address ROM deficits, address strength deficits, analyze and address soft tissue restrictions, analyze and cue movement patterns, analyze and modify body mechanics/ergonomics, assess and modify postural abnormalities and instruct in home and community integration to attain remaining goals. Progress toward goals / Updated goals:  · Goals for this certification period include and are to be achieved in   7  treatments: 1. Patient will improve FOTO assessment score to 74 pts in order to indicate improved functional abilities. 2.Patient will improve ARTEMIO hip extension to 20 deg for improved posture and gait mechanics  3. Increase R hip strength by =/> 1/3 muscle grade with all measured planes for increased strength and endurance with standing/walking ADL's.  Added resistance with clams for hip abd/ER strengthening (12/22/21).  -12/29: goal progressing, increased reps with clams  4. Pt will report =/> 75% reduction in pain/symptoms with repetitive bending and heavy lifting type ADL's for increased function with ADL's. Current: pt reports significant improvement in symptoms with minimal pain and only slight numbness in R GT (12/22/21)     PLAN  [x]  Upgrade activities as tolerated yes Continue plan of care   []  Discharge due to :    []  Other:      Therapist: Tono Correa.  Delroy Traore, PT    Date: 12/29/2021 Time: 8:01 AM      Future Appointments   Date Time Provider Parish Aguero   12/29/2021  7:00 AM Alexandra Waldron, PT [facility]   1/7/2022  7:00 AM Fransisca Montano, PTA [facility]   1/14/2022  7:00 AM Fransisca Montano, PTA [facility]   1/21/2022  7:00 AM Fransisca Montano, PTA [facility]   1/28/2022  7:00 AM Fransisca Montano, PTA [facility]

## 2022-01-07 ENCOUNTER — HOSPITAL ENCOUNTER (OUTPATIENT)
Dept: PHYSICAL THERAPY | Age: 67
Discharge: HOME | End: 2022-01-07
Payer: COMMERCIAL

## 2022-01-07 PROCEDURE — 97110 THERAPEUTIC EXERCISES: CPT

## 2022-01-07 PROCEDURE — 97140 MANUAL THERAPY 1/> REGIONS: CPT

## 2022-01-07 NOTE — PROGRESS NOTES
PHYSICAL THERAPY - DAILY TREATMENT NOTE    Patient Name: Niurka Olivas        Date: 2022  : 1955   yes Patient  Verified  Visit #:     Insurance: Payor: Faizan Vargas / Plan: [facility] / Product Type: PPO /      In time: 7:00 Out time: 7:54   Total Treatment Time: 54     Medicare/[employer] Time Tracking (below)   Total Timed Codes (min):  54 1:1 Treatment Time:  46     TREATMENT AREA =  Radiculopathy, lumbosacral region [M54.17]    SUBJECTIVE  Pain Level (on 0 to 10 scale): 1-2 10   Medication Changes/New allergies or changes in medical history, any new surgeries or procedures?    no  If yes, update Summary List   Subjective Functional Status/Changes:  []  No changes reported     I think that my hip is doing a lot better because I have to dig down deep to find the pain in my hip, I can tell it's still there, but it's not as intense or constant like it used to be. OBJECTIVE    44  36  1:1 min Therapeutic Exercise:  [x]  See flow sheet   Rationale:      increase ROM and increase strength to improve the patients ability to improve functional abilities      10  1:1 min Manual Therapy: Long axis R LE distraction, Instrument assisted soft tissue mobilization applied to R glut/posterolateral hip and ITB using GT-1   Rationale:      decrease pain, increase ROM, increase tissue extensibility, decrease trigger points and increase postural awareness to improve patient's ability to improve functional mobility   The manual therapy interventions were performed at a separate and distinct time from the therapeutic activities interventions.     Billed With/As:   [] TE   [] TA   [] Neuro   [] Self Care Patient Education: [x] Review HEP    [] Progressed/Changed HEP based on:   [] positioning   [] body mechanics   [] transfers   [] heat/ice application    [] other:      Other Objective/Functional Measures:     Post Treatment Pain Level (on 0 to 10) scale:   5  / 10 ASSESSMENT  Assessment/Changes in Function:   See Progress note/Physician update for full detailed progress towards established goals. [x]  See Progress Note/Recertification   Patient will continue to benefit from skilled PT services to modify and progress therapeutic interventions, address functional mobility deficits, address ROM deficits, address strength deficits, analyze and address soft tissue restrictions, analyze and cue movement patterns, analyze and modify body mechanics/ergonomics, assess and modify postural abnormalities and instruct in home and community integration to attain remaining goals. Progress toward goals / Updated goals:  See Progress note/Physician update for full detailed progress towards established goals.      PLAN  [x]  Upgrade activities as tolerated yes Continue plan of care   []  Discharge due to :    []  Other:      Therapist: Donnell Suárez PTA    Date: 1/7/2022 Time: 7:03 AM     Future Appointments   Date Time Provider Parish Aguero   1/14/2022  7:00 AM Nathaneiagus Reddy, PTA [facility]   1/21/2022  7:00 AM Nathaneiagus Reddy, PTA [facility]   1/28/2022  7:00 AM Nathaneil Reddy, PTA [facility]

## 2022-01-07 NOTE — PROGRESS NOTES
[facility] PHYSICAL THERAPY  [address], [place], [address] - Phone: [phone]  Fax: [phone]  PROGRESS NOTE  Patient Name: Lyly Bai : 1955   Treatment/Medical Diagnosis: Radiculopathy, lumbosacral region [M54.17]   Referral Source: Ryder Frazier MD     Date of Initial Visit: 11/10/21 Attended Visits: 9 Missed Visits: 0     SUMMARY OF TREATMENT  Therapeutic exercise for lumbar/LE flexibility, postural awareness/strengthening, lumbopelvic/core stabilization, manual intervention, moist heat and HEP. CURRENT STATUS  Pt reports 85% overall improvement in sx since beginning care. Pt reports 4/10 max pain, 1-2/10 avg pain. Pain made worse with repetitive bending and reaching type ADL's. Pt is making steady progress gaining LE/hip mobility as well as advancing with strengthening and lumbopelvic/core stabilization within current POC. Pt also presents with decreased palpable glut/piriformis tension with only c/o deep glut tenderness over the past 2 treatments, but still presents with moderate IT band tenderness with manual intervention. Improvements: Pt reports the most functional improvement with decreased frequency and intensity of R glut/piriformis pain/symptoms as well as all R LE radicular symptoms abolished since beginning therapy. Remaining functional limitations: Increased limitations/pain/symptoms with repetitive bending and reaching type ADL's. Objective Measurements:  R hip strength measurements/MMT: Flexion= 4/5; Abduction= 4/5; Extension= 4-/5  Hip Extension AROM= L=18 deg, R= 17 deg  FOTO 67/100    Goal/Measure of Progress Goal Met? 1. Patient will improve FOTO assessment score to 74 pts in order to indicate improved functional abilities. Status at last Eval: 63/100 Current Status: 67/100 progress   2.   Patient will improve ARTEMIO hip extension to 20 deg for improved posture and gait mechanics   Status at last Eval: L=12 deg, R= 8 deg Current Status: L 18 deg, R 17 deg progress   3. Increase R hip strength by =/> 1/3 muscle grade with all measured planes for increased strength and endurance with standing/walking ADL's. Status at last Eval: Flexion= 4-/5; Abduction= 4/5; Extension= 3+/5 Current Status: Flexion= 4/5; Abduction= 4/5; Extension= 4-/5 Nearly met, all except for abduction strength/MMT     4. Pt will report =/> 75% reduction in pain/symptoms with repetitive bending and heavy lifting type ADL's for increased function with ADL's. Status at last Eval: New goal established last assessment  Current Status: 60% reduction reported progress     New Goals to be achieved in __6-8_  treatments:  1. Patient will improve FOTO assessment score to 74 pts in order to indicate improved functional abilities. 2. Patient will improve ARTEMIO hip extension to 20 deg for improved posture and gait mechanics  3. Increase R hip abduction strength by =/> 1/3 muscle grade for increased strength and endurance with standing/walking ADL's  4. Pt will report =/> 75% reduction in pain/symptoms with repetitive bending and heavy lifting type ADL's for increased function with ADL's. RECOMMENDATIONS  Continue with current POC for 6-8 additional visits with advancing as tolerated, then reassess for discharge from therapy as planned/discussed. If you have any questions/comments please contact us directly at [phone]. Thank you for allowing us to assist in the care of your patient.     Therapist Signature: Екатерина Cruz PTA Date: 1/7/2022    KYLIE Malave Time: 7:05 AM   NOTE TO PHYSICIAN:  PLEASE COMPLETE THE ORDERS BELOW AND FAX TO   [facility] [place] Physical Therapy: [phone]  If you are unable to process this request in 24 hours please contact our office: [phone]    ___ I have read the above report and request that my patient continue as recommended.   ___ I have read the above report and request that my patient continue therapy with the following changes/special instructions:_________________________________________________________   ___ I have read the above report and request that my patient be discharged from therapy.      Physician Signature:        Date:       Time:       Emelia Elizalde MD

## 2022-01-14 ENCOUNTER — APPOINTMENT (OUTPATIENT)
Dept: PHYSICAL THERAPY | Age: 67
End: 2022-01-14
Payer: COMMERCIAL

## 2022-01-21 ENCOUNTER — APPOINTMENT (OUTPATIENT)
Dept: PHYSICAL THERAPY | Age: 67
End: 2022-01-21
Payer: COMMERCIAL

## 2022-01-28 ENCOUNTER — HOSPITAL ENCOUNTER (OUTPATIENT)
Dept: PHYSICAL THERAPY | Age: 67
Discharge: HOME | End: 2022-01-28
Payer: COMMERCIAL

## 2022-01-28 PROCEDURE — 97110 THERAPEUTIC EXERCISES: CPT

## 2022-01-28 PROCEDURE — 97140 MANUAL THERAPY 1/> REGIONS: CPT

## 2022-01-28 NOTE — PROGRESS NOTES
PHYSICAL THERAPY - DAILY TREATMENT NOTE    Patient Name: Karly Plascencia        Date: 2022  : 1955   yes Patient  Verified  Visit #:   10   of   15-17  Insurance: Payor: Syed Kim / Plan: [address] / Product Type: PPO /      In time: 7:03 Out time: 8:00   Total Treatment Time: 57     Medicare/[employer] Time Tracking (below)   Total Timed Codes (min):  57 1:1 Treatment Time:  57     TREATMENT AREA =  Radiculopathy, lumbosacral region [M54.17]    SUBJECTIVE  Pain Level (on 0 to 10 scale):  5  / 10   Medication Changes/New allergies or changes in medical history, any new surgeries or procedures?    no  If yes, update Summary List   Subjective Functional Status/Changes:  []  No changes reported     My buttock and hip is a little bit more sore than usual from doing a lot of running around and cleaning around the house over the past few days. OBJECTIVE    45  1:1 min Therapeutic Exercise:  [x]  See flow sheet   Rationale:      increase ROM and increase strength to improve the patients ability to improve functional abilities      12  1:1 min Manual Therapy: Long axis R LE distraction, Instrument assisted soft tissue mobilization applied to R glut/posterolateral hip using GT-1   Rationale:      decrease pain, increase ROM, increase tissue extensibility, decrease trigger points and increase postural awareness to improve patient's ability to improve functional mobility   The manual therapy interventions were performed at a separate and distinct time from the therapeutic activities interventions. Billed With/As:   [] TE   [] TA   [] Neuro   [] Self Care Patient Education: [x] Review HEP    [] Progressed/Changed HEP based on:   [] positioning   [] body mechanics   [] transfers   [] heat/ice application    [] other:      Other Objective/Functional Measures:   Addition of R step quadratus stretches to program today     Post Treatment Pain Level (on 0 to 10) scale:   1-2  / 10 ASSESSMENT  Assessment/Changes in Function:   Pt presented with chief c/o increased R hip/glut soreness from repetitive bending/squatting type ADL's but was able to resume full normal therex regiment after approximately 3 weeks since last treatment with min to mod challenge today. Pt presented with the most tension in SIJ and upper pelvic region with manual intervention today. Will continue to progress/advance patient within current POC as tolerated with monitoring symptoms. []  See Progress Note/Recertification   Patient will continue to benefit from skilled PT services to modify and progress therapeutic interventions, address functional mobility deficits, address ROM deficits, address strength deficits, analyze and address soft tissue restrictions, analyze and cue movement patterns, analyze and modify body mechanics/ergonomics, assess and modify postural abnormalities and instruct in home and community integration to attain remaining goals. Progress toward goals / Updated goals:  New Goals to be achieved in __6-8_  treatments:  1. Patient will improve FOTO assessment score to 74 pts in order to indicate improved functional abilities. 2. Patient will improve ARTEMIO hip extension to 20 deg for improved posture and gait mechanics  3. Increase R hip abduction strength by =/> 1/3 muscle grade for increased strength and endurance with standing/walking ADL's  4. Pt will report =/> 75% reduction in pain/symptoms with repetitive bending and heavy lifting type ADL's for increased function with ADL's.1/28/22: Progressing, Pt reports approximately 65% reduction in pain/symptoms with repetitive bending and heavy lifting type ADL's for increased function with ADL's.      PLAN  [x]  Upgrade activities as tolerated yes Continue plan of care   []  Discharge due to :    []  Other:      Therapist: Sanchez aMrtinez PTA    Date: 1/28/2022 Time: 6:59 AM     Future Appointments   Date Time Provider Parish Aguero   1/28/2022  7:00 EDWIGE Worthington, PTA [facility]

## 2022-02-04 ENCOUNTER — HOSPITAL ENCOUNTER (OUTPATIENT)
Dept: PHYSICAL THERAPY | Age: 67
Discharge: HOME | End: 2022-02-04
Payer: COMMERCIAL

## 2022-02-04 PROCEDURE — 97140 MANUAL THERAPY 1/> REGIONS: CPT

## 2022-02-04 PROCEDURE — 97110 THERAPEUTIC EXERCISES: CPT

## 2022-02-04 PROCEDURE — 97530 THERAPEUTIC ACTIVITIES: CPT

## 2022-02-04 NOTE — PROGRESS NOTES
PHYSICAL THERAPY - DAILY TREATMENT NOTE    Patient Name: Charanjit Duke        Date: 2022  : 1955   yes Patient  Verified  Visit #:   11   of   15-17  Insurance: Payor: Bambi Abraham / Plan: [employer] / Product Type: PPO /      In time: 7:00 Out time: 8:00   Total Treatment Time: 60     Medicare/[employer] Time Tracking (below)   Total Timed Codes (min):  60 1:1 Treatment Time:  46     TREATMENT AREA =  Radiculopathy, lumbosacral region [M54.17]    SUBJECTIVE  Pain Level (on 0 to 10 scale):  1 / 10   Medication Changes/New allergies or changes in medical history, any new surgeries or procedures?    no  If yes, update Summary List   Subjective Functional Status/Changes:  []  No changes reported     I feel like most of the deep soreness is almost gone out of my buttock and upper leg,so I do think that the therapy is helping, but I would like to continue with the therapy just to make sure. OBJECTIVE    33  19  1:1 min Therapeutic Exercise:  [x]  See flow sheet   Rationale:      increase ROM and increase strength to improve the patients ability to improve functional abilities      12  1:1 min Manual Therapy: Long axis R LE distraction, Instrument assisted soft tissue mobilization applied to R glut/posterolateral hip using GT-1&3   Rationale:      decrease pain, increase ROM, increase tissue extensibility and decrease trigger points to improve patient's ability to improve functional mobility   The manual therapy interventions were performed at a separate and distinct time from the therapeutic activities interventions. 15  1:1 min Therapeutic Activity: [x]  See flow sheet   Rationale:     Through reassessment of all established goals including mobility and strength reassessment for progress note      Billed With/As:   [] TE   [] TA   [] Neuro   [] Self Care Patient Education: [x] Review HEP    [] Progressed/Changed HEP based on:   [] positioning   [] body mechanics   [] transfers   [] heat/ice application    [] other:      Other Objective/Functional Measures:         Post Treatment Pain Level (on 0 to 10) scale:   2 / 10     ASSESSMENT  Assessment/Changes in Function:   See Progress note/Physician update for full detailed progress towards established goals. []  See Progress Note/Recertification   Patient will continue to benefit from skilled PT services to modify and progress therapeutic interventions, address functional mobility deficits, address ROM deficits, address strength deficits, analyze and address soft tissue restrictions, analyze and cue movement patterns, analyze and modify body mechanics/ergonomics, assess and modify postural abnormalities and instruct in home and community integration to attain remaining goals. Progress toward goals / Updated goals:  See Progress note/Physician update for full detailed progress towards established goals.      PLAN  [x]  Upgrade activities as tolerated yes Continue plan of care   []  Discharge due to :    []  Other:      Therapist: Bard Yvette PTA    Date: 2/4/2022 Time: 7:05 AM     Future Appointments   Date Time Provider Parish Aguero   2/11/2022  7:00 AM Yo Stevens PTA [facility]   2/18/2022  7:00 AM Yo Stevens PTA [facility]   2/25/2022  7:00 AM Yo Stevens PTA [facility]

## 2022-02-04 NOTE — PROGRESS NOTES
[facility] PHYSICAL THERAPY  [address], [place], [address] - Phone: [phone]  Fax: [phone]  PROGRESS NOTE  Patient Name: Fozia Beckman : 1955   Treatment/Medical Diagnosis: Radiculopathy, lumbosacral region [M54.17]   Referral Source: Yimi Corrigan MD     Date of Initial Visit: 11/10/21 Attended Visits: 11 Missed Visits: 1     SUMMARY OF TREATMENT  Therapeutic exercise for lumbar/LE flexibility, postural awareness/strengthening, lumbopelvic/core stabilization, manual intervention, moist heat and HEP. CURRENT STATUS  Pt reports 85% overall improvement in sx since beginning care. Pt reports 2/10 max pain, 1-2/10 avg pain. Pain made worse with repetitive bending, reaching and lifting type ADL's. Pt also presents with point tenderness around R greater trochanter with manual intervention during manual intervention today, but decreased piriformis and posterolateral hip tension/tenderness over the past few visits. Pt is making steady progress gaining LE/hip mobility as well as advancing with strengthening and lumbopelvic/core stabilization within current POC. Improvements: Pt reports the most functional improvement with decreased frequency and intensity of R glut/piriformis pain/symptoms as well as all R LE radicular symptoms abolished since beginning therapy. Remaining functional limitations: Increased limitations/pain/symptoms with repetitive bending, reaching and lifting type ADL's. Objective Measurements:  R hip strength measurements/MMT: Flexion= 4+/5; Abduction= 4/5; Extension= 4-/5  Hip Extension AROM= 17 degrees bilaterally     FOTO 65/100    Goal/Measure of Progress Goal Met? 1. Patient will improve FOTO assessment score to 74 pts in order to indicate improved functional abilities. Status at last Eval: 67/100 Current Status: 65/100 no   2.   Patient will improve ARTEMIO hip extension to 20 deg for improved posture and gait mechanics Status at last Eval: L 18 deg, R 17 deg Current Status: 17 degrees bilaterally  progress   3. Increase R hip strength by =/> 1/3 muscle grade with all measured planes for increased strength and endurance with standing/walking ADL's. Status at last Eval: Flexion= 4/5; Abduction= 4/5; Extension= 4-/5 Current Status: Flexion= 4+/5; Abduction= 4/5; Extension= 4/5 Nearly met, all except for abduction      4. Pt will report =/> 75% reduction in pain/symptoms with repetitive bending and heavy lifting type ADL's for increased function with ADL's. Status at last Eval: 60% reduction reported Current Status: 75% reduction reported yes     New Goals to be achieved in __3__  treatments:  1. Patient will improve FOTO assessment score to 74 pts in order to indicate improved functional abilities. 2. Increase R hip abduction and extension strength to 4+/5 for increased strength and endurance with standing/walking ADL's.  3. Pt will be given and instructed in an updated HEP for continued self maintenance of reduced symptoms after D/C from therapy. RECOMMENDATIONS  Continue with current POC for 3 remaining visits left on current script with advancing as tolerated, then reassess for discharge from therapy as planned/discussed. If you have any questions/comments please contact us directly at [phone]. Thank you for allowing us to assist in the care of your patient.     Therapist Signature: Donnell Suárez PTA Date: 2/4/2022    KYLIE Rodriguez Time: 7:15 AM   NOTE TO PHYSICIAN:  PLEASE COMPLETE THE ORDERS BELOW AND FAX TO   [facility] Physical Therapy: [phone]  If you are unable to process this request in 24 hours please contact our office: [phone]    ___ I have read the above report and request that my patient continue as recommended.   ___ I have read the above report and request that my patient continue therapy with the following changes/special instructions:_________________________________________________________   ___ I have read the above report and request that my patient be discharged from therapy.      Physician Signature:        Date:       Time:       Sathya De Leon MD

## 2022-02-11 ENCOUNTER — APPOINTMENT (OUTPATIENT)
Dept: PHYSICAL THERAPY | Age: 67
End: 2022-02-11
Payer: COMMERCIAL

## 2022-02-18 ENCOUNTER — HOSPITAL ENCOUNTER (OUTPATIENT)
Dept: PHYSICAL THERAPY | Age: 67
Discharge: HOME | End: 2022-02-18
Payer: COMMERCIAL

## 2022-02-18 PROCEDURE — 97530 THERAPEUTIC ACTIVITIES: CPT

## 2022-02-18 PROCEDURE — 97110 THERAPEUTIC EXERCISES: CPT

## 2022-02-18 NOTE — PROGRESS NOTES
PHYSICAL THERAPY - DAILY TREATMENT NOTE    Patient Name: Brigette Talley        Date: 2022  : 1955   yes Patient  Verified  Visit #:   12   of   15-17  Insurance: Payor: Dustyadina Delarosa / Plan: [facility] / Product Type: PPO /      In time: 7:00 Out time: 7:50   Total Treatment Time: 50     Medicare/BCBS Time Tracking (below)   Total Timed Codes (min):  50 1:1 Treatment Time:  45     TREATMENT AREA =  Radiculopathy, lumbosacral region [M54.17]    SUBJECTIVE  Pain Level (on 0 to 10 scale):  0  / 10   Medication Changes/New allergies or changes in medical history, any new surgeries or procedures?    no  If yes, update Summary List   Subjective Functional Status/Changes:  []  No changes reported     My hip and leg has been feeling pretty good lately and I don't have the time to get in here anytime over the next week or 2, so I think that I am going to make today my last day and do the exercises on my own. OBJECTIVE    30  25  1:1 min Therapeutic Exercise:  [x]  See flow sheet   Rationale:      increase ROM and increase strength to improve the patients ability to improve functional abilities        20  1:1 min Therapeutic Activity: [x]  See flow sheet   Rationale: Through reassessment of all established goals including mobility and strength reassessment for discharge summary     Billed With/As:   [] TE   [] TA   [] Neuro   [] Self Care Patient Education: [x] Review HEP    [] Progressed/Changed HEP based on:   [] positioning   [] body mechanics   [] transfers   [] heat/ice application    [] other:      Other Objective/Functional Measures:         Post Treatment Pain Level (on 0 to 10) scale:   0 / 10     ASSESSMENT  Assessment/Changes in Function:   See discharge summary for full final detailed progress towards all established goals.       [x]  See Progress Note/Recertification      Progress toward goals / Updated goals:  See discharge summary for full final detailed progress towards all established goals. PLAN  []  Upgrade activities as tolerated no Continue plan of care   [x]  Discharge due to : Patient feels that she has achieved maximum medical benefit/potential from current POC after completing 12 of 15-17 prescribed visits and is ready for discharge from therapy at this time. []  Other:      Therapist: Jasbir Quinn PTA    Date: 2/18/2022 Time: 7:03 AM     No future appointments.

## 2022-02-18 NOTE — PROGRESS NOTES
Physical Therapy Discharge Instructions  [facility]  [facility]  [address], [address]  P: [phone] F: [phone]    Patient: Valentine Lezama  : 1955    Reason for Discharge From PT:  [x]Met/progressing towards all set goals    []Minimal progress made towards set goals    []Met a plateau in progress/improvement    []Insurance/financial issues    []Other:     Recommendations:   [x] Return to activity with home program as prescribed on print-outs    [] Return to activity with the following modifications:     [] [employer] fitness training     [] Return to/join local gym    [] Other:      Continue with      [] Ice [x] Heat   as needed for 10-15 minutes to relieve pain  *If pain does not improve after several days, follow-up with your physician for a consult*           Follow up with MD:     [] Upon completion of therapy   [x] As needed      Additional Comments: Great Job! It was nice working with you! Thank you for choosing [facility] [place] for your PT needs!       April Ascencio, HEIDI 2022 7:46 AM

## 2022-02-18 NOTE — PROGRESS NOTES
[address] [place]  [facility] PHYSICAL THERAPY  [address], [place], [address] - Phone: [phone]  Fax: [phone]  DISCHARGE SUMMARY  Patient Name: Alex Mclaughlin : 1955   Treatment/Medical Diagnosis: Radiculopathy, lumbosacral region [M54.17]   Referral Source: Monique Farooq MD     Date of Initial Visit: 11/10/21 Attended Visits: 12 Missed Visits: 1     SUMMARY OF TREATMENT  Therapeutic exercise for lumbar/LE flexibility, postural awareness/strengthening, lumbopelvic/core stabilization, manual intervention, moist heat and HEP. CURRENT STATUS  Pt reports 90% overall improvement in sx since beginning care. Pt reports 2/10 max pain, 1/10 avg pain. Pain made worse with repetitive bending, reaching and lifting type ADL's. Pt has made progress gaining LE/hip mobility as well as advancing with strengthening and lumbopelvic/core stabilization within current POC. Improvements: Pt reports the most functional improvement with decreased frequency and intensity of R glut/piriformis pain/symptoms as well as all R LE radicular symptoms abolished since beginning therapy. Remaining functional limitations: Increased limitations/pain/symptoms with repetitive bending, reaching and lifting type ADL's.      Objective Measurements:  R hip strength measurements/MMT: Flexion= 5/5; Abduction= 4+/5; Extension= 4+/5  Hip Extension AROM= R= 17 deg; L=20 deg    FOTO 72/100    Goal/Measure of Progress Goal Met? 1. Patient will improve FOTO assessment score to 74 pts in order to indicate improved functional abilities. Status at last Eval: 65/100 Current Status: 72/100 progress   2. Increase R hip abduction and extension strength to 4+/5 for increased strength and endurance with standing/walking ADL's. Status at last Eval: Abduction= 4/5; Extension= 4/5 Current Status: Abduction= 4+/5; Extension= 4+/5 yes   3.   Pt will be given and instructed in an updated HEP for continued self maintenance of reduced symptoms after D/C from therapy. Status at last Eval: Ongoing Current Status: Met, updated and issued yes       RECOMMENDATIONS  Patient feels that she has achieved maximum medical benefit/potential from current POC after completing 12 of 15-17 prescribed visits and is ready for discharge from therapy at this time. If you have any questions/comments please contact us directly at [phone]. Thank you for allowing us to assist in the care of your patient. Therapist Signature: Donnell Suárez PTA Date: 2/18/2022    KYLIE Rodriguez Time: 7:22 AM   NOTE TO PHYSICIAN:  PLEASE COMPLETE THE ORDERS BELOW AND FAX TO   [facility] [place] [facility]: [phone]  If you are unable to process this request in 24 hours please contact our office: [phone]    ___ I have read the above report and request that my patient continue as recommended.   ___ I have read the above report and request that my patient continue therapy with the following changes/special instructions:_________________________________________________________   ___ I have read the above report and request that my patient be discharged from therapy.      Physician Signature:        Date:       Time:       Gerri South MD

## 2022-02-25 ENCOUNTER — APPOINTMENT (OUTPATIENT)
Dept: PHYSICAL THERAPY | Age: 67
End: 2022-02-25
Payer: COMMERCIAL

## 2022-05-06 ENCOUNTER — APPOINTMENT (OUTPATIENT)
Dept: PHYSICAL THERAPY | Age: 67
End: 2022-05-06
Payer: COMMERCIAL

## 2022-05-18 ENCOUNTER — APPOINTMENT (OUTPATIENT)
Dept: PHYSICAL THERAPY | Age: 67
End: 2022-05-18
Payer: COMMERCIAL

## 2022-05-25 ENCOUNTER — HOSPITAL ENCOUNTER (OUTPATIENT)
Dept: PHYSICAL THERAPY | Age: 67
Discharge: HOME | End: 2022-05-25
Payer: COMMERCIAL

## 2022-05-25 PROCEDURE — 97162 PT EVAL MOD COMPLEX 30 MIN: CPT

## 2022-05-25 PROCEDURE — 97140 MANUAL THERAPY 1/> REGIONS: CPT

## 2022-05-25 PROCEDURE — 97110 THERAPEUTIC EXERCISES: CPT

## 2022-05-25 NOTE — PROGRESS NOTES
PT DAILY TREATMENT NOTE     Patient Name: Chris Pedroza  Date:2022  : 1955  [x]  Patient  Verified  [place]or[place] [employer] / Plan: [place] / Product Type: Medicare /    In time:8:27  Out time:9:20  Total Treatment Time (min): 48  Visit #: 1 of     Medicare/BCBS Only   Total Timed Codes (min):  23 1:1 Treatment Time:  53       Treatment Area: Acute right-sided low back pain with right-sided sciatica [M54.41]  Pain in right hip [M25.551]    SUBJECTIVE  Pain Level (0-10 scale): 1  Any medication changes, allergies to medications, adverse drug reactions, diagnosis change, or new procedure performed?: [x] No    [] Yes (see summary sheet for update)  Subjective functional status/changes:   [] No changes reported  Pt initial eval see POC for details    OBJECTIVE        30 min [x]Eval                  []Re-Eval       10 min Therapeutic Exercise:  [] See flow sheet :   Rationale: increase ROM and increase strength to improve the patient's ability to perform yard work    13 min Manual Therapy:  stm to ARTEMIO upper glutes, sacral scrub, trigger point release to R lateral calf   The manual therapy interventions were performed at a separate and distinct time from the therapeutic activities interventions.   Rationale: decrease pain, increase ROM and increase tissue extensibility to improve tissue excursion during functional mobility and ADLs              With   [] TE   [] TA   [] neuro   [] other: Patient Education: [x] Review HEP    [] Progressed/Changed HEP based on:   [] positioning   [] body mechanics   [] transfers   [] heat/ice application    [] other:      Other Objective/Functional Measures:    Justification for Eval Code Complexity:  Patient History : see POC   Examination see exam   Clinical Presentation: evolving  Clinical Decision Making : FOTO : 63/100    Pain Level (0-10 scale) post treatment: 1    ASSESSMENT/Changes in Function: Pt was instructed in initial HEP required demo, vc, and tc for all exercises to perform correctly. Pt was given hand out detailing exercises and instructed in modification of exercises to tolerance, and in performing exercises safely. Pt verbalized understanding. Patient will continue to benefit from skilled PT services to modify and progress therapeutic interventions, address functional mobility deficits, address ROM deficits, address strength deficits, analyze and address soft tissue restrictions, analyze and cue movement patterns, analyze and modify body mechanics/ergonomics, assess and modify postural abnormalities, address imbalance/dizziness and instruct in home and community integration to attain remaining goals.      []  See Plan of Care  []  See progress note/recertification  []  See Discharge Summary         Progress towards goals / Updated goals:  No progress as goals were set today    PLAN  []  Upgrade activities as tolerated     []  Continue plan of care  []  Update interventions per flow sheet       []  Discharge due to:_  []  Other:_      Agusto Linares 5/25/2022  8:24 AM    Future Appointments   Date Time Provider Parish Aguero   5/25/2022  8:30 AM Keyana Bolanos [facility]

## 2022-05-25 NOTE — PROGRESS NOTES
[address] [facility]YS[facility]ER[facility] AT [address], [address] [place] [address] Road  Phone: [phone]   [phone]  PLAN OF CARE / [address] [place] [address] PHYSICAL THERAPY SERVICES  Patient Name: Redd Telles : 1955   Medical   Diagnosis: Acute right-sided low back pain with right-sided sciatica [M54.41]  Pain in right hip [M25.551] Treatment Diagnosis: Other low back pain [M54.59]   Sciatica, R sided [M54.31]   Onset Date: 22     Referral Source: Ayo Summers NP Start of Care [facility]): 2022   Prior Hospitalization: See medical history Provider #: [id number]   Prior Level of Function: I in all functional mobility and ADLs, active doing yard work   Comorbidities: HTN   Medications: Verified on Patient Summary List   The Plan of Care and following information is based on the information from the initial evaluation.   ===========================================================================================  Assessment / key information:  Pt is a 77 y.o. F who presents with low back pain and R sided sciatica s/p falling on 22. Current deficits include: dec hip ROM, dec hip and core strength, dec dynamic pelvic and lumbar stabilization. Functional deficits include: impaired lifting, impaired ability to perform typical home chores ( yard work). FOTO score indicates 37% functional impairment. Home exercise program initiated on initial evaluation to address these deficits. Pt would benefit from PT to address these deficits for increased functional mobility and QOL. ADRIEL:Pt reports that she was pulping old paper in a large trash can with water, and went to dump it. She was using a rake to pull the handle to tip the can, the handle broke and she fell onto her R butt cheek in the yard. It didn't hurt too bad the first day, but the next day it was excruciating. She saw her MD who did x rays which pt reports showed no fracture or other problem.  She took a round of steroids and robaxin which helped. Initially she was having to sleep in her recliner due to the pain, and now she is able to sleep in her bed again. At this point she has not been having to take medication and a lot of the pain she was having has decreased, her MD recommended doing another round of therapy, which she also felt would help. She has one sore spot deep in her R butt cheek and then some residual pain in her R calf. She also reports the R calf will occasionally have a burning feeling that can also go to the bottom side of her big toe. The pain is worse with overexertion (heavy lifting), yard work. The pain is better with walking and light exercise. Pt denies all red flags. Pt works full time as an  at [employer], uses no AD, and lives in a one story home with her spouse. OBJECTIVE:    PAIN:  Location- R hip, RLE  Current- 1/10  Best- 1/10  Worst- 10/10  Alleviating factors: light exercise  Aggravating factors: overexertion    MMT:  Hip   -flex L=3+/5 R=3/5  -ext L=3/5 R=3/5  -abd L=3/5 R=3-/5  Knee  -flex L=4/5 R=3+/5  -ext L=4+/5 R=4/5  Ankle  - DF L=4/5 R=3+/5    Sensation: light touch grossly intact    Posture: grossly WNL, mild R lateral trunk shift, mild R hip elevation in comparison to L    Balance: SLS  R= WNL, with inc postural sway, LE IR, difficulty stabilizing at the knee.    L= WNL    AROM:  Lumbar ROM grossly WNL, mild R sided pain with L side bend, L rotation, no reversal of lumbar lordosis with forward flexion  Hip  Flexion- WNL ARTEMIO  Extension-  L=10deg, R= -4 deg    Outcome Measure: FOTO=63    Palpation for Tenderness: ARTEMIO SI joint lines, artemio upper glutes, R TFL, R piriformis, R lateral calf    Special Tests:  ELys positive ARTEMIO  Slump Test- positive on R  Jamarcus positive R    ===========================================================================================  Eval Complexity: History MEDIUM  Complexity : 1-2 comorbidities / personal factors will impact the outcome/ POC ;  Examination  MEDIUM Complexity : 3 Standardized tests and measures addressing body structure, function, activity limitation and / or participation in recreation ; Presentation MEDIUM Complexity : Evolving with changing characteristics ; Decision Making MEDIUM Complexity : FOTO score of 26-74; Overall Complexity MEDIUM  Problem List: pain affecting function, decrease ROM, decrease strength, edema affecting function, impaired gait/ balance, decrease ADL/ functional abilitiies, decrease activity tolerance, decrease flexibility/ joint mobility and decrease transfer abilities   Treatment Plan may include any combination of the following: Therapeutic exercise, Therapeutic activities, Neuromuscular re-education, Physical agent/modality, Gait/balance training, Manual therapy, Patient education, Self Care training, Functional mobility training, Home safety training and Stair training  Patient / Family readiness to learn indicated by: asking questions  Persons(s) to be included in education: patient (P)  Barriers to Learning/Limitations: None  Measures taken, if barriers to learning:    Patient Goal (s): \"manage symptoms\"   Patient self reported health status: good  Rehabilitation Potential: good  Short Term Goals: To be accomplished in 1 weeks:  1) Goal: Patient will be independent and compliant with HEP in order to progress toward long term goals. Status at last note/certification: issued and reviewed  Long Term Goals: To be accomplished in 8-12 treatments:  1) Goal: Patient will improve FOTO assessment score to 74 pts in order to indicate improved functional abilities. Status at last note/certification: 63 pts  2) Goal: Patient will improve ARTEMIO hip extension to 20 deg for improved posture and gait mechanics  Status at last note/certification:  [id number], R= -4 deg  3) Goal: Patient will report worst back pain as 4/10 or less in order to progress toward personal goals.   Status at last note/certification: 03/94  4) Goal: Patient will report overall at least 65% improvement in function in order to progress toward premorbid status. Status at last note/certification: n/a    Frequency / Duration:   Patient to be seen  1-2  times per week for 4-6  weeks: All LTG as above will be assessed and updated every 10 visits or 30 days and progressed as needed    Patient / Caregiver education and instruction: exercises  Therapist Signature: Mortimer Rusk Date: 7/38/3451   Certification Period: na Time: 8:24 AM   ===========================================================================================  I certify that the above Physical Therapy Services are being furnished while the patient is under my care. I agree with the treatment plan and certify that this therapy is necessary. Physician Signature:        Date:       Time:        Remigio Scott NP  Please sign and return to [facility] or you may fax the signed copy to [phone]. Thank you.

## 2022-06-03 ENCOUNTER — HOSPITAL ENCOUNTER (OUTPATIENT)
Dept: PHYSICAL THERAPY | Age: 67
Discharge: HOME | End: 2022-06-03
Payer: COMMERCIAL

## 2022-06-03 PROCEDURE — 97140 MANUAL THERAPY 1/> REGIONS: CPT

## 2022-06-03 PROCEDURE — 97110 THERAPEUTIC EXERCISES: CPT

## 2022-06-03 NOTE — PROGRESS NOTES
PHYSICAL THERAPY - DAILY TREATMENT NOTE    Patient Name: Ny Johnston        Date: 6/3/2022  : 1955   yes Patient  Verified  Visit #:   2     Insurance: Payor: Ros Travis / Plan: [address] / Product Type: PPO /      In time: 7:02 Out time: 7:45   Total Treatment Time: 43     Medicare/BCBS Time Tracking (below)   Total Timed Codes (min):  43 1:1 Treatment Time:  43     TREATMENT AREA =  Other low back pain [M54.59]  Sciatica, right side [M54.31]    SUBJECTIVE  Pain Level (on 0 to 10 scale):  0  / 10   Medication Changes/New allergies or changes in medical history, any new surgeries or procedures?    no  If yes, update Summary List   Subjective Functional Status/Changes:  []  No changes reported     I'm not really experiencing any pain at all this morning, it's more of a deep down soreness in my right buttock more than anything. OBJECTIVE    30  1:1 min Therapeutic Exercise:  [x]  See flow sheet   Rationale:      increase ROM and increase strength to improve the patients ability to improve functional abilities      13  1:1 min Manual Therapy: R long axis LE distraction, manual piriformis stretching and Instrument assisted soft tissue mobilization applied to R glut/posterolateral hip using GT-1   Rationale:      decrease pain, increase ROM, increase tissue extensibility, decrease trigger points and increase postural awareness to improve patient's ability to improve functional mobility   The manual therapy interventions were performed at a separate and distinct time from the therapeutic activities interventions.     Billed With/As:   [] TE   [] TA   [] Neuro   [] Self Care Patient Education: [x] Review HEP    [] Progressed/Changed HEP based on:   [] positioning   [] body mechanics   [] transfers   [] heat/ice application    [] other:      Other Objective/Functional Measures:  Verbal cueing for proper form/technique with various exercises during treatment today  Addition of mini band side steps and R piriformis stretches to program today   Post Treatment Pain Level (on 0 to 10) scale:   0  / 10     ASSESSMENT  Assessment/Changes in Function:   Pt tolerated all new therex well upon trial today with chief c/o deep lateral glut/posterolateral hip pain/symptoms with prolonged standing,but minimal to no pain at rest. Pt did present with mild palpable tenderness/tension in region with manual intervention today. Pt needs the most focus on lumbopelvic/core stabilization and hip mobility. Will continue to progress/advance patient within current POC as tolerated with monitoring symptoms. []  See Progress Note/Recertification   Patient will continue to benefit from skilled PT services to modify and progress therapeutic interventions, address functional mobility deficits, address ROM deficits, address strength deficits, analyze and address soft tissue restrictions, analyze and cue movement patterns, analyze and modify body mechanics/ergonomics, assess and modify postural abnormalities and instruct in home and community integration to attain remaining goals. Progress toward goals / Updated goals:  Short Term Goals: To be accomplished in 1 weeks:  1) Goal: Patient will be independent and compliant with HEP in order to progress toward long term goals. Status at last note/certification: issued and reviewed 6/3/22: Not Met, pt admits inconsistent compliance with current HEP, approximately ever other day, increased compliance was re enforced   [address] be accomplished in 8-12 treatments:  1) Goal: Patient will improve FOTO assessment score to 74 pts in order to indicate improved functional abilities.   Status at last note/certification: 63 pts  2) Goal: Patient will improve ARTEMIO hip extension to 20 deg for improved posture and gait mechanics  Status at last note/certification:  [id number], R= -4 deg  3) Goal: Patient will report worst back pain as 4/10 or less in order to progress toward personal goals.  Status at last note/certification: 90/19  4) Goal: Patient will report overall at least 65% improvement in function in order to progress toward premorbid status.   Status at last note/certification: n/a     PLAN  [x]  Upgrade activities as tolerated yes Continue plan of care   []  Discharge due to :    []  Other:      Therapist: Tashi Bello PTA    Date: 6/3/2022 Time: 7:04 AM     Future Appointments   Date Time Provider Parish Aguero   6/7/2022  7:00 AM Clois Pateloka, PTA [facility]   6/14/2022  7:00 AM Cloearl Opoka, PTA [facility]   6/21/2022  7:00 AM Cloearl Chiu, PTA [facility]   6/28/2022  7:00 AM [facility] PT [place] [facility]

## 2022-06-07 ENCOUNTER — HOSPITAL ENCOUNTER (OUTPATIENT)
Dept: PHYSICAL THERAPY | Age: 67
Discharge: HOME | End: 2022-06-07
Payer: COMMERCIAL

## 2022-06-07 PROCEDURE — 97140 MANUAL THERAPY 1/> REGIONS: CPT

## 2022-06-07 PROCEDURE — 97110 THERAPEUTIC EXERCISES: CPT

## 2022-06-07 NOTE — PROGRESS NOTES
PHYSICAL THERAPY - DAILY TREATMENT NOTE    Patient Name: Kenrick Buckner        Date: 2022  : 1955   yes Patient  Verified  Visit #:   3   of     Insurance: Payor: Wilfrid Menendez / Plan: Efren Lui / Product Type: PPO /      In time: 7:02 Out time: 7:45   Total Treatment Time: 43     Medicare/[facility] Time Tracking (below)   Total Timed Codes (min):  43 1:1 Treatment Time:  43     TREATMENT AREA =  Other low back pain [M54.59]  Sciatica, right side [M54.31]    SUBJECTIVE  Pain Level (on 0 to 10 scale):  0  / 10   Medication Changes/New allergies or changes in medical history, any new surgeries or procedures?    no  If yes, update Summary List   Subjective Functional Status/Changes:  []  No changes reported     My back and hip have been feeling pretty good since I was here last, no real issues at all and I really wasn't even that sore after the workout last time. OBJECTIVE    30  1:1 min Therapeutic Exercise:  [x]  See flow sheet   Rationale:      increase ROM and increase strength to improve the patients ability to improve functional abilities      13  1:1 min Manual Therapy: R long axis LE distraction, manual piriformis stretching and Instrument assisted soft tissue mobilization applied to R glut/posterolateral hip using GT-1   Rationale:      decrease pain, increase ROM, increase tissue extensibility, decrease trigger points and increase postural awareness to improve patient's ability to improve functional mobility   The manual therapy interventions were performed at a separate and distinct time from the therapeutic activities interventions.       Billed With/As:   [] TE   [] TA   [] Neuro   [] Self Care Patient Education: [x] Review HEP    [] Progressed/Changed HEP based on:   [] positioning   [] body mechanics   [] transfers   [] heat/ice application    [] other:      Other Objective/Functional Measures:  Verbal cueing for proper form/technique with various exercises during treatment today  Addition of bridges with ball adduction to program today   Post Treatment Pain Level (on 0 to 10) scale:   0  / 10     ASSESSMENT  Assessment/Changes in Function:   Pt presented with no reoccurrence of R lumbar or hip/sciatic pain/symptoms since last treatment with only c/o mild tenderness to deep palpation of lateral hip/greater trochanter region. Pt was also able to advance to addition of bridges with ball adduction with moderate challenge today. Will continue to progress/advance patient within current POC as tolerated with monitoring symptoms. []  See Progress Note/Recertification   Patient will continue to benefit from skilled PT services to modify and progress therapeutic interventions, address functional mobility deficits, address ROM deficits, address strength deficits, analyze and address soft tissue restrictions, analyze and cue movement patterns, analyze and modify body mechanics/ergonomics, assess and modify postural abnormalities and instruct in home and community integration to attain remaining goals. Progress toward goals / Updated goals:  Short Term Goals: To be accomplished in 1 weeks:  1) Goal: Patient will be independent and compliant with HEP in order to progress toward long term goals. Status at last note/certification: issued and reviewed 6/3/22: Not Met, pt admits inconsistent compliance with current HEP, approximately ever other day, increased compliance was re enforced   [address] be accomplished in 8-12 treatments:  1) Goal: Patient will improve FOTO assessment score to 74 pts in order to indicate improved functional abilities. Status at last note/certification: 23 OIP  2) Goal: Patient will improve ARTEMIO hip extension to 20 deg for improved posture and gait mechanics  Status at last note/certification:  L=10deg, R= -4 deg  3) Goal: Patient will report worst back pain as 4/10 or less in order to progress toward personal goals.   Status at last note/certification: 89/67; 6/7/22: Met, Pt reports 0/10 pain level since last treatment   4) Goal: Patient will report overall at least 65% improvement in function in order to progress toward premorbid status.   Status at last note/certification: n/a     PLAN  [x]  Upgrade activities as tolerated yes Continue plan of care   []  Discharge due to :    []  Other:      Therapist: Lucero Hamm PTA    Date: 6/7/2022 Time: 7:04 AM     Future Appointments   Date Time Provider Parish Aguero   6/14/2022  7:00 AM Lennox Ort, PTA [facility]   6/21/2022  7:00 AM Lennox Ort, PTA [facility]   6/28/2022  7:00 AM [facility] PT [facility] [place] 1 [facility]

## 2022-06-14 ENCOUNTER — HOSPITAL ENCOUNTER (OUTPATIENT)
Dept: PHYSICAL THERAPY | Age: 67
Discharge: HOME | End: 2022-06-14
Payer: COMMERCIAL

## 2022-06-14 PROCEDURE — 97110 THERAPEUTIC EXERCISES: CPT

## 2022-06-14 PROCEDURE — 97140 MANUAL THERAPY 1/> REGIONS: CPT

## 2022-06-14 NOTE — PROGRESS NOTES
PHYSICAL THERAPY - DAILY TREATMENT NOTE    Patient Name: Carina Hidalgo        Date: 2022  : 1955   yes Patient  Verified  Visit #:   4     Insurance: Payor: Santino Delgado / Plan: [facility] / Product Type: PPO /      In time: 7:00 Out time: 7:46   Total Treatment Time: 46     Medicare/[employer] Time Tracking (below)   Total Timed Codes (min):  46 1:1 Treatment Time:  46     TREATMENT AREA =  Other low back pain [M54.59]  Sciatica, right side [M54.31]    SUBJECTIVE  Pain Level (on 0 to 10 scale):  0 / 10   Medication Changes/New allergies or changes in medical history, any new surgeries or procedures?    no  If yes, update Summary List   Subjective Functional Status/Changes:  []  No changes reported     My back and hip has been doing really good, I think that what we doing is really working. OBJECTIVE    34  1:1 min Therapeutic Exercise:  [x]  See flow sheet   Rationale:      increase ROM and increase strength to improve the patients ability to improve functional abilities     12  1:1 min Manual Therapy: R long axis LE distraction, manual piriformis stretching and Instrument assisted soft tissue mobilization applied to R glut/posterolateral and IT band hip using GT-1   Rationale:      decrease pain, increase ROM, increase tissue extensibility, decrease trigger points and increase postural awareness to improve patient's ability to improve functional mobility  The manual therapy interventions were performed at a separate and distinct time from the therapeutic activities interventions.       Billed With/As:   [] TE   [] TA   [] Neuro   [] Self Care Patient Education: [x] Review HEP    [] Progressed/Changed HEP based on:   [] positioning   [] body mechanics   [] transfers   [] heat/ice application    [] other:      Other Objective/Functional Measures:  Verbal cueing for proper form/technique with various exercises during treatment today  Addition of [employer] squats and sidelying mini band clamshells to program and advanced to bridges with mini band abduction today   Post Treatment Pain Level (on 0 to 10) scale:   0  / 10     ASSESSMENT  Assessment/Changes in Function:   Patient reports approximately 85-90% overall improvement from therapy since initial evaluation with the most functional improvement with decreased hip pain/tenderness as well as decreased LE muscle spasms. Pt was also able to advance to addition of [employer] squats and sidelying mini band to program and advanced to bridges with mini band abduction. Pt did however, present with increased IT band tenderness with manual intervention today. Will review detailed progress/goals for physician update next treatment with continuing to progress/advance within current POC/protocol as tolerated. []  See Progress Note/Recertification   Patient will continue to benefit from skilled PT services to modify and progress therapeutic interventions, address functional mobility deficits, address ROM deficits, address strength deficits, analyze and address soft tissue restrictions, analyze and cue movement patterns, analyze and modify body mechanics/ergonomics, assess and modify postural abnormalities and instruct in home and community integration to attain remaining goals. Progress toward goals / Updated goals:  Short Term Goals: To be accomplished in 1 weeks:  1) Goal: Patient will be independent and compliant with HEP in order to progress toward long term goals. Status at last note/certification: issued and reviewed 6/3/22: Not Met, pt admits inconsistent compliance with current HEP, approximately ever other day, increased compliance was re enforced   Long Term Goals: To be accomplished in 8-12 treatments:  1) Goal: Patient will improve FOTO assessment score to 74 pts in order to indicate improved functional abilities.   Status at last note/certification: 27 WIQ  2) Goal: Patient will improve ARTEMIO hip extension to 20 deg for improved posture and gait mechanics  Status at last note/certification:  L=10deg, R= -4 deg  3) Goal: Patient will report worst back pain as 4/10 or less in order to progress toward personal goals. Status at last note/certification: 07/32; 6/7/22: Met, Pt reports 0/10 pain level since last treatment   4) Goal: Patient will report overall at least 65% improvement in function in order to progress toward premorbid status.   Status at last note/certification: n/a 2/61/89: Met, Pt reports approximately 85-90% overall improvement since initial evaluation      PLAN  [x]  Upgrade activities as tolerated yes Continue plan of care   []  Discharge due to :    []  Other:      Therapist: Cleveland Jones PTA    Date: 6/14/2022 Time: 6:58 AM     Future Appointments   Date Time Provider Parish Aguero   6/14/2022  7:00 AM Ely Orourke PTA [facility]   6/21/2022  7:00 AM Ely Orourke PTA [facility]   6/28/2022  7:00 AM [facility] PT [facility] [place] 1 [facility]

## 2022-06-21 ENCOUNTER — HOSPITAL ENCOUNTER (OUTPATIENT)
Dept: PHYSICAL THERAPY | Age: 67
Discharge: HOME | End: 2022-06-21
Payer: COMMERCIAL

## 2022-06-21 PROCEDURE — 97110 THERAPEUTIC EXERCISES: CPT

## 2022-06-21 PROCEDURE — 97530 THERAPEUTIC ACTIVITIES: CPT

## 2022-06-21 NOTE — PROGRESS NOTES
Physical Therapy Discharge Instructions  [address]  [address], [place] [address]  P: [phone] F: [phone]    Patient: Little Anderson  : 1955    Reason for Discharge From PT:  [x]Met/progressing towards all set goals    []Minimal progress made towards set goals    []Met a plateau in progress/improvement    []Insurance/financial issues    []Other:     Recommendations:   [x] Return to activity with home program as prescribed on print-outs    [] Return to activity with the following modifications:     [] [employer] fitness training     [] Return to/join local gym    [] Other:      Continue with      [] Ice [x] Heat   as needed for 10-15 minutes to relieve pain  *If pain does not improve after several days, follow-up with your physician for a consult*           Follow up with MD:     [] Upon completion of therapy   [x] As needed      Additional Comments: Great Job!; It was nice working with you again! Thank you for choosing [facility] for your PT needs!       Danielito Chavira, PTA 2022 7:36 AM

## 2022-06-21 NOTE — PROGRESS NOTES
PHYSICAL THERAPY - DAILY TREATMENT NOTE    Patient Name: Drea Lainez        Date: 2022  : 1955   yes Patient  Verified  Visit #:     Insurance: Payor: Linda Harrell / Plan: [address] / Product Type: PPO /      In time: 7:02 Out time: 7:52   Total Treatment Time: 50     Medicare/[employer] Time Tracking (below)   Total Timed Codes (min):  50 1:1 Treatment Time:  43     TREATMENT AREA =  Other low back pain [M54.59]  Sciatica, right side [M54.31]    SUBJECTIVE  Pain Level (on 0 to 10 scale):  0  / 10   Medication Changes/New allergies or changes in medical history, any new surgeries or procedures?    no  If yes, update Summary List   Subjective Functional Status/Changes:  []  No changes reported     My back and hip has been doing really good, I haven't really been in any pain unless I do certain things like bending or squatting certain ways or do anything strenuous. OBJECTIVE    37  28  1:1 min Therapeutic Exercise:  [x]  See flow sheet   Rationale:      increase ROM and increase strength to improve the patients ability to improve functional abilities      15  1:1 min Therapeutic Activity: [x]  See flow sheet   Rationale: Through reassessment of all established goals including mobility and strength reassessment for progress note      Billed With/As:   [] TE   [] TA   [] Neuro   [] Self Care Patient Education: [x] Review HEP    [] Progressed/Changed HEP based on:   [] positioning   [] body mechanics   [] transfers   [] heat/ice application    [] other:      Other Objective/Functional Measures:         Post Treatment Pain Level (on 0 to 10) scale:   0  / 10     ASSESSMENT  Assessment/Changes in Function:   See discharge summary for full final detailed progress towards all established goals. [x]  See Progress Note/Recertification      Progress toward goals / Updated goals:  See discharge summary for full final detailed progress towards all established goals. PLAN  []  Upgrade activities as tolerated no Continue plan of care   [x]  Discharge due to : Patient agrees that she has achieved maximum medical benefit/potential from current POC after completing 5 of 8-12 prescribed visits and is ready for discharge from therapy at this time.    []  Other:      Therapist: Wu Mendoza PTA    Date: 6/21/2022 Time: 7:02 AM     Future Appointments   Date Time Provider Parish Aguero   6/28/2022  7:00 AM [facility] PT [facility] [place] 1 [facility]

## 2022-06-21 NOTE — PROGRESS NOTES
[address] [place] [address] PHYSICAL THERAPY  [facility], [place], [address] - Phone: [phone]  Fax: [phone]  DISCHARGE SUMMARY  Patient Name: Kannan Gastelum : 1955   Treatment/Medical Diagnosis: Other low back pain [M54.59]  Sciatica, right side [M54.31]   Referral Source: Sarath Moreno NP     Date of Initial Visit: 22 Attended Visits: 5 Missed Visits: 0     SUMMARY OF TREATMENT  Therapeutic exercise for lumbar/LE flexibility, postural awareness/strengthening, lumbopelvic/core stabilization, manual intervention, patient education and HEP. CURRENT STATUS  Pt reports 90% overall improvement in sx since start of care. Notes max pain 2/10 with performing increased strenuous activities such as repetitive bending and lifting type ADL's. Pt has made good progress with gaining lumbar and hip mobility as well as advancing with strengthening and lumbopelvic/core stabilization within current POC. Pt was given and instructed in an updated HEP for continued self maintenance of reduced symptoms after D/C from therapy. Improvements: Pt reports the most functional improvement with the ability to self manage pain/symptoms with HEP as well as techniques that she has learned over the course of therapy. Remaining Functional limitations: Increased limitations/pain/symptoms with performing increased strenuous activities such as repetitive bending and lifting type ADL's. Objective:   Lumbar AROM= Flexion= full/WFL's; Extension= 75%; Side bending=full/WFL's bilaterally   Hip strength measurements/MMT= (R/L)= Flexion= 5/5, 4+/5; Abduction= 4-/5, 4/5; Extension= 4/5, 4-/5  Hip extension AROM= 20 degrees bilaterally     Goal/Measure of Progress Goal Met? 1. Patient will be independent and compliant with HEP in order to progress toward long term goals.    Status at last Eval: issued and reviewed Current Status: Pt reports independence and compliance with current HEP every other day progress   2. Patient will improve FOTO assessment score to 74 pts in order to indicate improved functional abilities. Status at last Eval: 63/100 Current Status: 70/100 progress   3. Patient will improve ARTEMIO hip extension to 20 deg for improved posture and gait mechanics   Status at last Eval:  L=10deg, R= -4 deg Current Status:  20 degrees bilaterally yes     Goal/Measure of Progress Goal Met? 4. Patient will report worst back pain as 4/10 or less in order to progress toward personal goals. Status at last Eval: 10/10 Current Status: 2/10 yes   5. Patient will report overall at least 65% improvement in function in order to progress toward premorbid status   Status at last Eval: n/a Current Status: 90% improvement reported  yes       RECOMMENDATIONS  Patient agrees that she has achieved maximum medical benefit/potential from current POC after completing 5 of 8-12 prescribed visits and is ready for discharge from therapy at this time. If you have any questions/comments please contact us directly at [phone]. Thank you for allowing us to assist in the care of your patient.     Therapist Signature: Tori Amos PTA Date: 6/21/22    Hector Simmons DPT Time: 7:11 AM

## 2022-06-28 ENCOUNTER — APPOINTMENT (OUTPATIENT)
Dept: PHYSICAL THERAPY | Age: 67
End: 2022-06-28
Payer: COMMERCIAL

## 2024-02-10 ENCOUNTER — HOSPITAL ENCOUNTER (OUTPATIENT)
Facility: HOSPITAL | Age: 69
End: 2024-02-10
Payer: MEDICARE

## 2024-02-10 DIAGNOSIS — R04.0 EPISTAXIS: ICD-10-CM

## 2024-02-10 PROCEDURE — 70486 CT MAXILLOFACIAL W/O DYE: CPT

## 2024-02-12 ENCOUNTER — HOSPITAL ENCOUNTER (OUTPATIENT)
Facility: HOSPITAL | Age: 69
Discharge: HOME | End: 2024-02-15
Payer: MEDICARE

## 2024-02-12 LAB
BASOPHILS # BLD: 0 K/UL (ref 0–0.1)
BASOPHILS NFR BLD: 0 % (ref 0–2)
DIFFERENTIAL METHOD BLD: ABNORMAL
EOSINOPHIL # BLD: 0 K/UL (ref 0–0.4)
EOSINOPHIL NFR BLD: 0 % (ref 0–5)
ERYTHROCYTE [DISTWIDTH] IN BLOOD BY AUTOMATED COUNT: 12.5 % (ref 11.6–14.5)
HCT VFR BLD AUTO: 37.8 % (ref 35–45)
HGB BLD-MCNC: 12.6 G/DL (ref 12–16)
IMM GRANULOCYTES # BLD AUTO: 0 K/UL (ref 0–0.04)
IMM GRANULOCYTES NFR BLD AUTO: 0 % (ref 0–0.5)
LYMPHOCYTES # BLD: 1.8 K/UL (ref 0.9–3.6)
LYMPHOCYTES NFR BLD: 19 % (ref 21–52)
MCH RBC QN AUTO: 30 PG (ref 24–34)
MCHC RBC AUTO-ENTMCNC: 33.3 G/DL (ref 31–37)
MCV RBC AUTO: 90 FL (ref 78–100)
MONOCYTES # BLD: 0.9 K/UL (ref 0.05–1.2)
MONOCYTES NFR BLD: 9 % (ref 3–10)
NEUTS SEG # BLD: 6.7 K/UL (ref 1.8–8)
NEUTS SEG NFR BLD: 71 % (ref 40–73)
NRBC # BLD: 0 K/UL (ref 0–0.01)
NRBC BLD-RTO: 0 PER 100 WBC
PLATELET # BLD AUTO: 284 K/UL (ref 135–420)
PMV BLD AUTO: 10.6 FL (ref 9.2–11.8)
RBC # BLD AUTO: 4.2 M/UL (ref 4.2–5.3)
WBC # BLD AUTO: 9.6 K/UL (ref 4.6–13.2)

## 2024-02-12 PROCEDURE — 86235 NUCLEAR ANTIGEN ANTIBODY: CPT

## 2024-02-12 PROCEDURE — 86225 DNA ANTIBODY NATIVE: CPT

## 2024-02-12 PROCEDURE — 85025 COMPLETE CBC W/AUTO DIFF WBC: CPT

## 2024-02-12 PROCEDURE — 36415 COLL VENOUS BLD VENIPUNCTURE: CPT

## 2024-02-12 PROCEDURE — 83516 IMMUNOASSAY NONANTIBODY: CPT

## 2024-02-12 PROCEDURE — 86037 ANCA TITER EACH ANTIBODY: CPT

## 2024-02-13 LAB
CENTROMERE B AB SER-ACNC: 0.6 AI (ref 0–0.9)
CHROMATIN AB SERPL-ACNC: <0.2 AI (ref 0–0.9)
DSDNA AB SER-ACNC: <1 IU/ML (ref 0–9)
ENA JO1 AB SER-ACNC: <0.2 AI (ref 0–0.9)
ENA RNP AB SER-ACNC: <0.2 AI (ref 0–0.9)
ENA SCL70 AB SER-ACNC: 1 AI (ref 0–0.9)
ENA SM AB SER-ACNC: <0.2 AI (ref 0–0.9)
ENA SS-A AB SER-ACNC: <0.2 AI (ref 0–0.9)
ENA SS-B AB SER-ACNC: <0.2 AI (ref 0–0.9)
Lab: ABNORMAL

## 2024-02-14 LAB
C-ANCA TITR SER IF: NORMAL TITER
MYELOPEROXIDASE AB SER IA-ACNC: <0.2 UNITS (ref 0–0.9)
P-ANCA ATYPICAL TITR SER IF: NORMAL TITER
P-ANCA TITR SER IF: NORMAL TITER
PROTEINASE3 AB SER IA-ACNC: <0.2 UNITS (ref 0–0.9)

## 2024-07-27 ENCOUNTER — HOSPITAL ENCOUNTER (OUTPATIENT)
Facility: HOSPITAL | Age: 69
End: 2024-07-27
Payer: MEDICARE

## 2024-07-27 DIAGNOSIS — G89.29 CHRONIC MIDLINE LOW BACK PAIN WITH LEFT-SIDED SCIATICA: ICD-10-CM

## 2024-07-27 DIAGNOSIS — M54.42 CHRONIC MIDLINE LOW BACK PAIN WITH LEFT-SIDED SCIATICA: ICD-10-CM

## 2024-07-27 LAB
25(OH)D3 SERPL-MCNC: 76.7 NG/ML (ref 30–100)
ALBUMIN SERPL-MCNC: 3.9 G/DL (ref 3.4–5)
ALBUMIN/GLOB SERPL: 1.1 (ref 0.8–1.7)
ALP SERPL-CCNC: 120 U/L (ref 45–117)
ALT SERPL-CCNC: 24 U/L (ref 13–56)
ANION GAP SERPL CALC-SCNC: 5 MMOL/L (ref 3–18)
AST SERPL-CCNC: 22 U/L (ref 10–38)
BASOPHILS # BLD: 0.1 K/UL (ref 0–0.1)
BASOPHILS NFR BLD: 1 % (ref 0–2)
BILIRUB SERPL-MCNC: 0.5 MG/DL (ref 0.2–1)
BUN SERPL-MCNC: 17 MG/DL (ref 7–18)
BUN/CREAT SERPL: 21 (ref 12–20)
CALCIUM SERPL-MCNC: 9.2 MG/DL (ref 8.5–10.1)
CHLORIDE SERPL-SCNC: 103 MMOL/L (ref 100–111)
CHOLEST SERPL-MCNC: 283 MG/DL
CO2 SERPL-SCNC: 33 MMOL/L (ref 21–32)
CREAT SERPL-MCNC: 0.81 MG/DL (ref 0.6–1.3)
DIFFERENTIAL METHOD BLD: ABNORMAL
EOSINOPHIL # BLD: 0.2 K/UL (ref 0–0.4)
EOSINOPHIL NFR BLD: 2 % (ref 0–5)
ERYTHROCYTE [DISTWIDTH] IN BLOOD BY AUTOMATED COUNT: 12.8 % (ref 11.6–14.5)
GLOBULIN SER CALC-MCNC: 3.7 G/DL (ref 2–4)
GLUCOSE SERPL-MCNC: 89 MG/DL (ref 74–99)
HCT VFR BLD AUTO: 43.7 % (ref 35–45)
HDLC SERPL-MCNC: 133 MG/DL (ref 40–60)
HDLC SERPL: 2.1 (ref 0–5)
HGB BLD-MCNC: 13.9 G/DL (ref 12–16)
IMM GRANULOCYTES # BLD AUTO: 0 K/UL (ref 0–0.04)
IMM GRANULOCYTES NFR BLD AUTO: 0 % (ref 0–0.5)
LDLC SERPL CALC-MCNC: 138 MG/DL (ref 0–100)
LIPID PANEL: ABNORMAL
LYMPHOCYTES # BLD: 2.1 K/UL (ref 0.9–3.6)
LYMPHOCYTES NFR BLD: 22 % (ref 21–52)
MCH RBC QN AUTO: 29.2 PG (ref 24–34)
MCHC RBC AUTO-ENTMCNC: 31.8 G/DL (ref 31–37)
MCV RBC AUTO: 91.8 FL (ref 78–100)
MONOCYTES # BLD: 1 K/UL (ref 0.05–1.2)
MONOCYTES NFR BLD: 11 % (ref 3–10)
NEUTS SEG # BLD: 6.1 K/UL (ref 1.8–8)
NEUTS SEG NFR BLD: 64 % (ref 40–73)
NRBC # BLD: 0 K/UL (ref 0–0.01)
NRBC BLD-RTO: 0 PER 100 WBC
PLATELET # BLD AUTO: 287 K/UL (ref 135–420)
PMV BLD AUTO: 10.3 FL (ref 9.2–11.8)
POTASSIUM SERPL-SCNC: 3.4 MMOL/L (ref 3.5–5.5)
PROT SERPL-MCNC: 7.6 G/DL (ref 6.4–8.2)
RBC # BLD AUTO: 4.76 M/UL (ref 4.2–5.3)
SODIUM SERPL-SCNC: 141 MMOL/L (ref 136–145)
T4 FREE SERPL-MCNC: 1.1 NG/DL (ref 0.7–1.5)
TRIGL SERPL-MCNC: 60 MG/DL
TSH SERPL DL<=0.05 MIU/L-ACNC: 1.81 UIU/ML (ref 0.36–3.74)
VLDLC SERPL CALC-MCNC: 12 MG/DL
WBC # BLD AUTO: 9.6 K/UL (ref 4.6–13.2)

## 2024-07-27 PROCEDURE — 36415 COLL VENOUS BLD VENIPUNCTURE: CPT

## 2024-07-27 PROCEDURE — 72202 X-RAY EXAM SI JOINTS 3/> VWS: CPT

## 2024-07-27 PROCEDURE — 82306 VITAMIN D 25 HYDROXY: CPT

## 2024-07-27 PROCEDURE — 73502 X-RAY EXAM HIP UNI 2-3 VIEWS: CPT

## 2024-07-27 PROCEDURE — 72110 X-RAY EXAM L-2 SPINE 4/>VWS: CPT

## 2024-07-27 PROCEDURE — 84439 ASSAY OF FREE THYROXINE: CPT

## 2024-07-27 PROCEDURE — 80053 COMPREHEN METABOLIC PANEL: CPT

## 2024-07-27 PROCEDURE — 80061 LIPID PANEL: CPT

## 2024-07-27 PROCEDURE — 85025 COMPLETE CBC W/AUTO DIFF WBC: CPT

## 2024-07-27 PROCEDURE — 84443 ASSAY THYROID STIM HORMONE: CPT

## 2024-08-20 ENCOUNTER — HOSPITAL ENCOUNTER (OUTPATIENT)
Facility: HOSPITAL | Age: 69
Setting detail: RECURRING SERIES
Discharge: HOME | End: 2024-08-23
Payer: MEDICARE

## 2024-08-20 PROCEDURE — 97162 PT EVAL MOD COMPLEX 30 MIN: CPT

## 2024-08-20 PROCEDURE — 97110 THERAPEUTIC EXERCISES: CPT

## 2024-08-20 NOTE — THERAPY EVALUATION
flex to ankles, ext 50% p!, R SB to joint line, L SB to proximal thigh p!, RR 75%, LR 50% p!      STRENGTH AROM PROM   Hip Left Right Left Right Left Right   Flexion 4/5 4/5 105 118      Extension 3-/5 3-/5   (SL) -4 p!  (SL) 0   Abduction 3+/5 p! 4-/5       ER 4-/5 4-/5 32 31     IR 3+/5 p! 4-/5 42 19 p     Knee Left Right Left Right Left Right   Extension 5/5 5/5       Flexion 4/5 4/5       Ankle Left Right Left Right Left Right   Dorsiflexion 5/5 5/5       Plantarflexion 3/5 3/5         PALPATION: Sig TTP to L piriformis, (+) referral of L LE paresthesias. Hypertonic L>R lumbar paraspinals..   SPECIAL TEST: (-) slump, SLR, Julia, faddir B. Flexion directional preference.   NEURO SCREEN: unremarkable.     Pt presents w/ sx suggesting/consistent with L piriformis syndrome/SIJ dysfunction. Pt could benefit from PT to address impairments and functional limitations in order to improve pt's ability to complete ADLs.    Evaluation Complexity:  History:  MEDIUM  Complexity : 1-2 comorbidities / personal factors will impact the outcome/ POC ; Examination:  MEDIUM Complexity : 3 Standardized tests and measures addressin body structure, function, activity limitation and / or participation in recreation  ;Presentation:  MEDIUM Complexity : Evolving with changing characteristics  ;Clinical Decision Making:  Oswestry Disability Index (MONICO) for Low Back Pain = 34 % ; (21% - 40% Moderate Disability) = MODERATE Complexity  Overall Complexity Rating: MEDIUM  Problem List: pain affecting function, decrease ROM, decrease strength, decrease ADL/functional abilities, decrease activity tolerance, decrease flexibility/joint mobility, and decrease transfer abilities    Treatment Plan may include any combination of the followin Therapeutic Exercise, 87890 Neuromuscular Re-Education, 85554 Manual Therapy, 67514 Therapeutic Activity, 20802 Self Care/Home Management, and 57285 Electrical Stim unattended  Patient / Family readiness to

## 2024-08-20 NOTE — PROGRESS NOTES
PHYSICAL / OCCUPATIONAL THERAPY - DAILY TREATMENT NOTE (updated )  For Eval visit    Patient Name: Emperatriz Sanderson    Date: 2024    : 1955  Insurance: Payor: MEDICARE / Plan: MEDICARE PART A AND B / Product Type: *No Product type* /      Patient  verified yes     Visit #   Current / Total 1 8   Time   In / Out 8:26 9:01   Pain   In / Out 4/10 4/10   Subjective Functional Status/Changes: See POC     TREATMENT AREA =  see POC    OBJECTIVE           23 min   Eval - untimed                      Therapeutic Procedures:    Tx Min Billable or 1:1 Min (if diff from Tx Min) Procedure, Rationale, Specifics   8  01768 Therapeutic Exercise (timed):  increase ROM, strength, coordination, balance, and proprioception to improve patient's ability to progress to PLOF and address remaining functional goals. (see flow sheet as applicable)     Details if applicable:  HEP review; see chart copy     4  16796 Manual Therapy (timed):  decrease pain and increase ROM to improve patient's ability to progress to PLOF and address remaining functional goals.  The manual therapy interventions were performed at a separate and distinct time from the therapeutic activities interventions . (see flow sheet as applicable)     Details if applicable:  MET for L jonatan-innominate post rotation, shotgun technique, STM to L piriformis           Details if applicable:            Details if applicable:     12  MC BC Totals Reminder: bill using total billable min of TIMED therapeutic procedures (example: do not include dry needle or estim unattended, both untimed codes, in totals to left)  8-22 min = 1 unit; 23-37 min = 2 units; 38-52 min = 3 units; 53-67 min = 4 units; 68-82 min = 5 units   Total Total     [x]  Patient Education billed concurrently with other procedures   [x] Review HEP    [] Progressed/Changed HEP, detail:    [] Other detail:       Objective Information/Functional Measures/Assessment    See POC    Patient will continue to

## 2024-08-22 NOTE — PROGRESS NOTES
PHYSICAL / OCCUPATIONAL THERAPY - DAILY TREATMENT NOTE    Patient Name: Emperatriz Sanderson    Date: 2024    : 1955  Insurance: Payor: MEDICARE / Plan: MEDICARE PART A AND B / Product Type: *No Product type* /      Patient  verified Yes     Visit #   Current / Total 2 8   Time   In / Out 700 745   Pain   In / Out 0/10 \"sore\" 0/10 \"sore\"   Subjective Functional Status/Changes: Pt reports no pain today, just soreness.     TREATMENT AREA =  Lumbago with sciatica, left side [M54.42]     OBJECTIVE         Therapeutic Procedures:    Tx Min Billable or 1:1 Min (if diff from Tx Min) Procedure, Rationale, Specifics   35 35 95415 Therapeutic Exercise (timed):  increase ROM, strength, coordination, balance, and proprioception to improve patient's ability to progress to PLOF and address remaining functional goals. (see flow sheet as applicable)     Details if applicable:       10 10 30502 Manual Therapy (timed):  decrease pain, increase ROM, increase tissue extensibility, and decrease trigger points to improve patient's ability to progress to PLOF and address remaining functional goals.  The manual therapy interventions were performed at a separate and distinct time from the therapeutic activities interventions . (see flow sheet as applicable)     Details if applicable:  Prone: STM glute med/piriformis, hip IR/ER PROM          Details if applicable:            Details if applicable:            Details if applicable:     45 45 MC BC Totals Reminder: bill using total billable min of TIMED therapeutic procedures (example: do not include dry needle or estim unattended, both untimed codes, in totals to left)  8-22 min = 1 unit; 23-37 min = 2 units; 38-52 min = 3 units; 53-67 min = 4 units; 68-82 min = 5 units   Total Total     [x]  Patient Education billed concurrently with other procedures   [x] Review HEP    [] Progressed/Changed HEP, detail:    [] Other detail:       Objective Information/Functional    9/3/2024  7:00 AM Mario Winters, PT [facility]   9/4/2024  7:00 AM Margie Kincaid, PT [facility]   9/9/2024  7:00 AM Margie Kinciad, PT [facility]   9/12/2024  7:00 AM Sb Leslie, PTA [facility]   9/16/2024  7:00 AM Belgica Grigsby, PT [facility]   9/18/2024  7:00 AM Belgica Grigsby, PT [facility]   9/23/2024  7:00 AM Belgica Grigsby, PT [facility]   9/25/2024  7:00 AM Margie Kincaid, PT [facility]

## 2024-08-23 ENCOUNTER — HOSPITAL ENCOUNTER (OUTPATIENT)
Facility: HOSPITAL | Age: 69
Setting detail: RECURRING SERIES
Discharge: HOME | End: 2024-08-26
Payer: MEDICARE

## 2024-08-23 PROCEDURE — 97110 THERAPEUTIC EXERCISES: CPT

## 2024-08-23 PROCEDURE — 97140 MANUAL THERAPY 1/> REGIONS: CPT

## 2024-08-24 ENCOUNTER — HOSPITAL ENCOUNTER (EMERGENCY)
Facility: HOSPITAL | Age: 69
Discharge: HOME | End: 2024-08-24
Attending: EMERGENCY MEDICINE
Payer: MEDICARE

## 2024-08-24 VITALS
SYSTOLIC BLOOD PRESSURE: 157 MMHG | HEART RATE: 94 BPM | TEMPERATURE: 98.4 F | RESPIRATION RATE: 18 BRPM | OXYGEN SATURATION: 98 % | WEIGHT: 126 LBS | DIASTOLIC BLOOD PRESSURE: 71 MMHG | HEIGHT: 60 IN | BODY MASS INDEX: 24.74 KG/M2

## 2024-08-24 DIAGNOSIS — I10 ESSENTIAL HYPERTENSION: ICD-10-CM

## 2024-08-24 DIAGNOSIS — R04.0 EPISTAXIS: Primary | ICD-10-CM

## 2024-08-24 DIAGNOSIS — E87.6 HYPOKALEMIA: ICD-10-CM

## 2024-08-24 LAB
ANION GAP SERPL CALC-SCNC: 6 MMOL/L (ref 3–18)
BASOPHILS # BLD: 0.1 K/UL (ref 0–0.1)
BASOPHILS NFR BLD: 1 % (ref 0–2)
BUN SERPL-MCNC: 10 MG/DL (ref 7–18)
BUN/CREAT SERPL: 14 (ref 12–20)
CALCIUM SERPL-MCNC: 8.7 MG/DL (ref 8.5–10.1)
CHLORIDE SERPL-SCNC: 102 MMOL/L (ref 100–111)
CO2 SERPL-SCNC: 30 MMOL/L (ref 21–32)
CREAT SERPL-MCNC: 0.74 MG/DL (ref 0.6–1.3)
DIFFERENTIAL METHOD BLD: ABNORMAL
EOSINOPHIL # BLD: 0.1 K/UL (ref 0–0.4)
EOSINOPHIL NFR BLD: 1 % (ref 0–5)
ERYTHROCYTE [DISTWIDTH] IN BLOOD BY AUTOMATED COUNT: 12.3 % (ref 11.6–14.5)
GLUCOSE SERPL-MCNC: 102 MG/DL (ref 74–99)
HCT VFR BLD AUTO: 38.9 % (ref 35–45)
HGB BLD-MCNC: 13.4 G/DL (ref 12–16)
IMM GRANULOCYTES # BLD AUTO: 0 K/UL (ref 0–0.04)
IMM GRANULOCYTES NFR BLD AUTO: 0 % (ref 0–0.5)
LYMPHOCYTES # BLD: 0.7 K/UL (ref 0.9–3.6)
LYMPHOCYTES NFR BLD: 8 % (ref 21–52)
MCH RBC QN AUTO: 30.2 PG (ref 24–34)
MCHC RBC AUTO-ENTMCNC: 34.4 G/DL (ref 31–37)
MCV RBC AUTO: 87.6 FL (ref 78–100)
MONOCYTES # BLD: 1.5 K/UL (ref 0.05–1.2)
MONOCYTES NFR BLD: 16 % (ref 3–10)
NEUTS SEG # BLD: 6.7 K/UL (ref 1.8–8)
NEUTS SEG NFR BLD: 74 % (ref 40–73)
NRBC # BLD: 0 K/UL (ref 0–0.01)
NRBC BLD-RTO: 0 PER 100 WBC
PLATELET # BLD AUTO: 215 K/UL (ref 135–420)
PMV BLD AUTO: 9.3 FL (ref 9.2–11.8)
POTASSIUM SERPL-SCNC: 2.8 MMOL/L (ref 3.5–5.5)
RBC # BLD AUTO: 4.44 M/UL (ref 4.2–5.3)
SODIUM SERPL-SCNC: 138 MMOL/L (ref 136–145)
WBC # BLD AUTO: 9 K/UL (ref 4.6–13.2)

## 2024-08-24 PROCEDURE — 80048 BASIC METABOLIC PNL TOTAL CA: CPT

## 2024-08-24 PROCEDURE — 85025 COMPLETE CBC W/AUTO DIFF WBC: CPT

## 2024-08-24 PROCEDURE — 99284 EMERGENCY DEPT VISIT MOD MDM: CPT

## 2024-08-24 RX ORDER — OXYMETAZOLINE HYDROCHLORIDE 0.05 G/100ML
2 SPRAY NASAL
Status: DISCONTINUED | OUTPATIENT
Start: 2024-08-24 | End: 2024-08-24 | Stop reason: HOSPADM

## 2024-08-24 RX ORDER — POTASSIUM CHLORIDE 1500 MG/1
20 TABLET, EXTENDED RELEASE ORAL DAILY
Qty: 10 TABLET | Refills: 0 | Status: SHIPPED | OUTPATIENT
Start: 2024-08-24

## 2024-08-24 ASSESSMENT — PAIN - FUNCTIONAL ASSESSMENT: PAIN_FUNCTIONAL_ASSESSMENT: NONE - DENIES PAIN

## 2024-08-24 ASSESSMENT — LIFESTYLE VARIABLES
HOW OFTEN DO YOU HAVE A DRINK CONTAINING ALCOHOL: MONTHLY OR LESS
HOW MANY STANDARD DRINKS CONTAINING ALCOHOL DO YOU HAVE ON A TYPICAL DAY: 1 OR 2

## 2024-08-24 NOTE — ED NOTES
I have introduced myself to the patient and discussed anticipated plan of care. Patient resting quietly on stretcher in low and locked position. Call bell in reach. Side rail up x1.  Family at bedside.   Nasal packing, dry and white, noted to right nare.

## 2024-08-24 NOTE — ED PROVIDER NOTES
[facility] EMERGENCY DEPT  EMERGENCY DEPARTMENT ENCOUNTER    Patient Name: Emperatriz Sanderson  MRN: [id number]  YOB: 1955  Provider: Alfredo Bell DO  PCP: Yolanda Mclaughlin APRN - NP   Time/Date of evaluation: 7:40 PM EDT on 8/24/24    History of Presenting Illness     History Provided by: Patient  History is limited by: Nothing     HISTORY:   Emperatriz Sanderson is a 68 y.o. female with history of hypertension presents to the emergency room with a chief complaint of epistaxis.  Patient states that she began to experience bleeding from her right nostril approximately 1 hour prior to arrival.  She states that she was able to control the bleeding with direct pressure and inserting tissues into her right nostril.  She states that she has been having increasing nasal congestion over the past 2 to 3 days.  Patient states that she has been flushing her nose daily with a saline wash.    Nursing Notes were all reviewed and agreed with or any disagreements were addressed in the HPI.    Past History     PAST MEDICAL HISTORY:  Past Medical History:   Diagnosis Date    Essential hypertension     Heart murmur     Palpitation     Syncope and collapse        PAST SURGICAL HISTORY:  History reviewed. No pertinent surgical history.    FAMILY HISTORY:  Family History   Problem Relation Age of Onset    Hypertension Mother     Kidney Disease Father     Hypertension Father     Kidney Disease Mother         Not on dialysis    Heart Failure Mother     Heart Disease Father        SOCIAL HISTORY:  Social History     Tobacco Use    Smoking status: Never    Smokeless tobacco: Never   Substance Use Topics    Alcohol use: Yes    Drug use: No       MEDICATIONS:  Current Facility-Administered Medications   Medication Dose Route Frequency Provider Last Rate Last Admin    oxymetazoline (AFRIN) 0.05 % nasal spray 2 spray  2 spray Each Nostril NOW Alfredo Bell DO         Current Outpatient Medications   Medication Sig Dispense Refill    amLODIPine  was noted to have a low potassium of 2.8.  She states that her doctor recently decreased her dose of potassium from 20 mEq to 10 mEq.  She will be prescribed potassium 10 mEq daily and instructed to follow-up with her primary care provider for reevaluation.       Patient was given the following medications:  Medications   oxymetazoline (AFRIN) 0.05 % nasal spray 2 spray (has no administration in time range)       CONSULTS: (Who and What was discussed)  None    Chronic Conditions: Hypertension    Social Determinants affecting Dx or Tx: None    Records Reviewed (source and summary of external notes): Nursing Notes, Old Medical Records, Previous Radiology Studies, and Previous Laboratory Studies    @[email or web address]@    Critical Care Time: None    SCREENING TOOLS:  None    CLINICAL MANAGEMENT TOOLS:  Not Applicable    Positive and negative test results were discussed. My clinical assessment and recommendations were discussed. They agree with the plan of discharge. Return precautions were discussed. All questions were answered and they are in agreement with plan.    7:50 PM Upon re-evaluation the patient's symptoms have improved. Pt has non-toxic appearance and condition is stable for discharge. She was informed of her results, instructed to f/u with her PCP and return to the ED upon worsening of symptoms. All questions and concerns were addressed.      Is this patient to be included in the SEP-1 core measure? No Exclusion criteria - the patient is NOT to be included for SEP-1 Core Measure due to: 2+ SIRS criteria are not met    MEDICATIONS ADMINISTERED IN THE ED:  Medications   oxymetazoline (AFRIN) 0.05 % nasal spray 2 spray (has no administration in time range)            None    Critical Care: None    Diagnosis and Disposition   Diagnosis:   1. Epistaxis    2. Essential hypertension    3. Hypokalemia          Disposition:    DISPOSITION Decision To Discharge 08/24/2024 07:43:24 PM    Home or Self Care      @[email or web address]@      Dragon Disclaimer     Please note that this dictation was completed with [employer] the computer voice recognition software. Quite often unanticipated grammatical, syntax, homophones, and other interpretive errors are inadvertently transcribed by the computer software. Please disregard these errors. Please excuse any errors that have escaped final proofreading.      I Alfredo Bell DO am the primary clinician of record.  Alfredo Bell DO  (Electronically signed)            Alfredo Bell DO  08/25/24 0120

## 2024-08-24 NOTE — ED TRIAGE NOTES
Pt reports onset of brb from right nare ~45 minutes pta. Pt states she immediately inserted nasal packing that she was previously given by her ENT. No bleeding through packing is currently noted.

## 2024-08-25 NOTE — ED NOTES
Pt called for lab results per MD. Pt instructed to increase Potassium dosage to  new prescription at pharmacy and to call PCP on Monday for a follow up. Pt verbalizes understanding to all.

## 2024-08-29 ENCOUNTER — HOSPITAL ENCOUNTER (OUTPATIENT)
Facility: HOSPITAL | Age: 69
Setting detail: RECURRING SERIES
End: 2024-08-29
Payer: MEDICARE

## 2024-08-29 PROCEDURE — 97110 THERAPEUTIC EXERCISES: CPT

## 2024-08-29 PROCEDURE — 97140 MANUAL THERAPY 1/> REGIONS: CPT

## 2024-08-29 NOTE — PROGRESS NOTES
Progressed/Changed HEP, detail:    [] Other detail:       Objective Information/Functional Measures/Assessment    Continued with exercises per POC and added sit to stands and hip hinges for increased hip and lumbar strength for  improved functional mobility with decreased pain.   Still with trigger points noted in the left piriformis/gluteal musculature but improved since last visit and improved this visit with manual therapy.     Patient demonstrates increased left Hip range of motion by 11 degrees progressing long term goal 2    Patient will continue to benefit from skilled PT / OT services to modify and progress therapeutic interventions, analyze and address functional mobility deficits, analyze and address ROM deficits, and analyze and address strength deficits to address functional deficits and attain remaining goals.    Progress toward goals / Updated goals:  []  See Progress Note/Recertification    Short Term Goals: To be accomplished in 2 weeks  Pt will be ind and compliant with HEP for self management of sx  Status at eval: issued on eval, pt return demos understanding  8/23/24: Progressing, reports initial compliance    Long Term Goals: To be accomplished in 4 weeks  Pt will demonstrate 10 reps supine bridge to full ROM, pain-free to improve ease of bed mobility  Status at eval: 1 rep to 50% ROM with inc L SIJ and l/s p!  2. Improve L hip IR AROM by at least 15 deg to improve mobility for car transfers  Status at eval: hip IR 19, p! (Prone)  Current: 8/29/2024 30 degrees with no pain - progressing   3. Improve OSW by >/= 13% to indicate improved function  Status at eval: 34%    Next PN/ RC due 9/20/2024  Auth due OMER (50v, 12/31/2024)    PLAN  - Continue Plan of Care    Stephanie Olson PTA    8/29/2024    7:27 AM  If an interpreting service was utilized for treatment of this patient, the contents of this document represent the material reviewed with the patient via the .     Future Appointments    Date Time Provider Department [place]   9/3/2024  7:00 AM Mario Winters, PT [facility]   9/4/2024  7:00 AM Margie Kincaid, PT [facility]   9/9/2024  7:00 AM Margie Kincaid, PT [facility]   9/12/2024  7:00 AM Sb Leslie, PTA [facility]   9/16/2024  7:00 AM Belgica Grigsby, PT [facility]   9/18/2024  7:00 AM Belgica Grigsby, PT [facility]   9/23/2024  7:00 AM Belgica Grigsby, PT [facility]   9/25/2024  7:00 AM Margie Kincaid, PT [facility]

## 2024-09-03 ENCOUNTER — APPOINTMENT (OUTPATIENT)
Facility: HOSPITAL | Age: 69
End: 2024-09-03
Payer: COMMERCIAL

## 2024-09-04 ENCOUNTER — HOSPITAL ENCOUNTER (OUTPATIENT)
Facility: HOSPITAL | Age: 69
Setting detail: RECURRING SERIES
Discharge: HOME | End: 2024-09-07
Payer: COMMERCIAL

## 2024-09-04 PROCEDURE — 97110 THERAPEUTIC EXERCISES: CPT

## 2024-09-04 PROCEDURE — 97140 MANUAL THERAPY 1/> REGIONS: CPT

## 2024-09-04 NOTE — PROGRESS NOTES
PHYSICAL / OCCUPATIONAL THERAPY - DAILY TREATMENT NOTE    Patient Name: Emperatriz Sanderson    Date: 2024    : 1955  Insurance: Payor: MEDICARE / Plan: MEDICARE PART A AND B / Product Type: *No Product type* /      Patient  verified Yes     Visit #   Current / Total 4 8   Time   In / Out 700 7:40   Pain   In / Out 4/10- L glute  4-5 (\"soreness\")   Subjective Functional Status/Changes: Pt reports no longer having pain as prior to therapy. She does have occasional tingling to left foot. Pt c/o low back pain with standing IR on RLE. \"Like a stiffness\"     TREATMENT AREA =  Lumbago with sciatica, left side [M54.42]     OBJECTIVE      Therapeutic Procedures:    Tx Min Billable or 1:1 Min (if diff from Tx Min) Procedure, Rationale, Specifics   28 28 19145 Therapeutic Exercise (timed):  increase ROM, strength, coordination, balance, and proprioception to improve patient's ability to progress to PLOF and address remaining functional goals. (see flow sheet as applicable)     Details if applicable:        47822 Manual Therapy (timed):  decrease pain, increase ROM, increase tissue extensibility, and decrease trigger points to improve patient's ability to progress to PLOF and address remaining functional goals.  The manual therapy interventions were performed at a separate and distinct time from the therapeutic activities interventions . (see flow sheet as applicable)     Details if applicable:  Prone: STM b/l lower l/s paraspinals, upper glutes, L piriformis. Manual L quad stretch          Details if applicable:            Details if applicable:            Details if applicable:     40 40 [facility] Totals Reminder: bill using total billable min of TIMED therapeutic procedures (example: do not include dry needle or estim unattended, both untimed codes, in totals to left)  8-22 min = 1 unit; 23-37 min = 2 units; 38-52 min = 3 units; 53-67 min = 4 units; 68-82 min = 5 units   Total Total     [x]  Patient Education billed

## 2024-09-09 ENCOUNTER — HOSPITAL ENCOUNTER (OUTPATIENT)
Facility: HOSPITAL | Age: 69
Setting detail: RECURRING SERIES
Discharge: HOME | End: 2024-09-12
Payer: MEDICARE

## 2024-09-09 PROCEDURE — 97110 THERAPEUTIC EXERCISES: CPT

## 2024-09-09 PROCEDURE — 97530 THERAPEUTIC ACTIVITIES: CPT

## 2024-09-09 PROCEDURE — 97140 MANUAL THERAPY 1/> REGIONS: CPT

## 2024-09-12 ENCOUNTER — HOSPITAL ENCOUNTER (OUTPATIENT)
Facility: HOSPITAL | Age: 69
Setting detail: RECURRING SERIES
Discharge: HOME | End: 2024-09-15
Payer: MEDICARE

## 2024-09-12 PROCEDURE — 97140 MANUAL THERAPY 1/> REGIONS: CPT

## 2024-09-12 PROCEDURE — 97530 THERAPEUTIC ACTIVITIES: CPT

## 2024-09-12 PROCEDURE — 97110 THERAPEUTIC EXERCISES: CPT

## 2024-09-16 ENCOUNTER — HOSPITAL ENCOUNTER (OUTPATIENT)
Facility: HOSPITAL | Age: 69
Setting detail: RECURRING SERIES
Discharge: HOME | End: 2024-09-19
Payer: MEDICARE

## 2024-09-16 PROCEDURE — 97110 THERAPEUTIC EXERCISES: CPT

## 2024-09-16 PROCEDURE — 97112 NEUROMUSCULAR REEDUCATION: CPT

## 2024-09-16 PROCEDURE — 97140 MANUAL THERAPY 1/> REGIONS: CPT

## 2024-09-18 ENCOUNTER — HOSPITAL ENCOUNTER (OUTPATIENT)
Facility: HOSPITAL | Age: 69
Setting detail: RECURRING SERIES
Discharge: HOME | End: 2024-09-21
Payer: MEDICARE

## 2024-09-18 PROCEDURE — 97530 THERAPEUTIC ACTIVITIES: CPT

## 2024-09-18 PROCEDURE — 97110 THERAPEUTIC EXERCISES: CPT

## 2024-09-18 PROCEDURE — 97535 SELF CARE MNGMENT TRAINING: CPT

## 2024-09-23 ENCOUNTER — HOSPITAL ENCOUNTER (OUTPATIENT)
Facility: HOSPITAL | Age: 69
Setting detail: RECURRING SERIES
End: 2024-09-23
Payer: COMMERCIAL

## 2024-09-25 ENCOUNTER — HOSPITAL ENCOUNTER (OUTPATIENT)
Facility: HOSPITAL | Age: 69
Setting detail: RECURRING SERIES
Discharge: HOME | End: 2024-09-28
Payer: MEDICARE

## 2024-09-25 PROCEDURE — 97530 THERAPEUTIC ACTIVITIES: CPT

## 2024-09-25 PROCEDURE — 97140 MANUAL THERAPY 1/> REGIONS: CPT

## 2024-09-25 PROCEDURE — 97110 THERAPEUTIC EXERCISES: CPT

## 2024-10-02 ENCOUNTER — APPOINTMENT (OUTPATIENT)
Facility: HOSPITAL | Age: 69
End: 2024-10-02
Payer: MEDICARE

## 2024-10-04 ENCOUNTER — HOSPITAL ENCOUNTER (OUTPATIENT)
Facility: HOSPITAL | Age: 69
Setting detail: RECURRING SERIES
Discharge: HOME | End: 2024-10-07
Payer: MEDICARE

## 2024-10-04 PROCEDURE — 97110 THERAPEUTIC EXERCISES: CPT

## 2024-10-04 PROCEDURE — 97140 MANUAL THERAPY 1/> REGIONS: CPT

## 2024-10-04 PROCEDURE — 97112 NEUROMUSCULAR REEDUCATION: CPT

## 2024-10-04 NOTE — PROGRESS NOTES
PHYSICAL / OCCUPATIONAL THERAPY - DAILY TREATMENT NOTE    Patient Name: Emperatriz Sanderson    Date: 10/4/2024    : 1955  Insurance: Payor: ELLIOT / Plan: NANO ZARATE FEDERAL / Product Type: *No Product type* /      Patient  verified Yes     Visit #   Current / Total 10 16   Time   In / Out 700 742   Pain   In / Out 6 4   Subjective Functional Status/Changes: Pt states she was helping her daughter move boxes of textbooks from a storage unit yesterday. Reports increased LBP and tingling/numbness down her L leg     TREATMENT AREA =  Lumbago with sciatica, left side [M54.42]     OBJECTIVE         Therapeutic Procedures:    Tx Min Billable or 1:1 Min (if diff from Tx Min) Procedure, Rationale, Specifics   20 20 72155 Therapeutic Exercise (timed):  increase ROM, strength, coordination, balance, and proprioception to improve patient's ability to progress to PLOF and address remaining functional goals. (see flow sheet as applicable)     Details if applicable:       x x 36345 Therapeutic Activity (timed):  use of dynamic activities replicating functional movements to increase ROM, strength, coordination, balance, and proprioception in order to improve patient's ability to progress to PLOF and address remaining functional goals.  (see flow sheet as applicable)     Details if applicable:     10 10 04604 Neuromuscular Re-Education (timed):  improve balance, coordination, kinesthetic sense, posture, core stability and proprioception to improve patient's ability to develop conscious control of individual muscles and awareness of position of extremities in order to progress to PLOF and address remaining functional goals. (see flow sheet as applicable)     Details if applicable:     12 12 91434 Manual Therapy (timed):  decrease pain, increase ROM, increase tissue extensibility, and decrease trigger points to improve patient's ability to progress to PLOF and address remaining functional goals.  The manual therapy interventions

## 2024-10-08 ENCOUNTER — HOSPITAL ENCOUNTER (OUTPATIENT)
Facility: HOSPITAL | Age: 69
Setting detail: RECURRING SERIES
Discharge: HOME | End: 2024-10-11
Payer: MEDICARE

## 2024-10-08 PROCEDURE — 97110 THERAPEUTIC EXERCISES: CPT

## 2024-10-08 PROCEDURE — 97140 MANUAL THERAPY 1/> REGIONS: CPT

## 2024-10-08 PROCEDURE — 97112 NEUROMUSCULAR REEDUCATION: CPT

## 2024-10-08 NOTE — PROGRESS NOTES
PHYSICAL / OCCUPATIONAL THERAPY - DAILY TREATMENT NOTE    Patient Name: Emperatriz Sanderson    Date: 10/8/2024    : 1955  Insurance: Payor: MEDICARE / Plan: MEDICARE PART A AND B / Product Type: *No Product type* /      Patient  verified Yes     Visit #   Current / Total 11 16   Time   In / Out 7:01 7:45   Pain   In / Out 6/10 0/10   Subjective Functional Status/Changes: Pt reports she drove to/from [place] this weekend and is experiencing increased pain in the L buttock and L lateral shin. \"I really think this is all stemming from my feet\". States she ordered [employer]en[employer] insoles from [employer] which should be in this week and is scheduling an apt with podiatry.      TREATMENT AREA =  Lumbago with sciatica, left side [M54.42]     OBJECTIVE    Modalities Rationale:     decrease inflammation and decrease pain to improve patient's ability to progress to PLOF and address remaining functional goals.     min [] Estim Unattended, type/location:                                      []  w/ice    []  w/heat    min [] Estim Attended, type/location:                                     []  w/US     []  w/ice    []  w/heat    []  TENS insruct      min []  Mechanical Traction: type/lbs                   []  pro   []  sup   []  int   []  cont    []  before manual    []  after manual    min []  Ultrasound, settings/location:     5 min  unbill [x]  Ice     []  Heat    location/position: To L glute in R SL w/ pillow    min []  Paraffin,  details:     min []  Vasopneumatic Device, press/temp:     min []  Whirlpool / Fluido:    If using vaso (only need to measure limb vaso being performed on)      pre-treatment girth :       post-treatment girth :       measured at (landmark location) :      min []  Other:    Skin assessment post-treatment:   Intact       Therapeutic Procedures:    Tx Min Billable or 1:1 Min (if diff from Tx Min) Procedure, Rationale, Specifics   12  31645 Therapeutic Exercise (timed):  increase ROM, strength,

## 2024-10-09 NOTE — PROGRESS NOTES
OMER (50v, 12/31/2024)    PLAN  - Continue Plan of Care  - Upgrade activities as tolerated    Sb Leslie PTA    10/10/2024    6:49 AM  If an interpreting service was utilized for treatment of this patient, the contents of this document represent the material reviewed with the patient via the .     Future Appointments   Date Time Provider Department Center   10/10/2024  7:00 AM Sb Leslie PTA [facility]   10/14/2024  7:00 AM Belgica Grigsby, PT [facility]   10/17/2024  7:00 AM Sb Leslie PTA [facility]   10/22/2024  7:00 AM Mile De La Rosa, PT [facility]   10/24/2024  7:00 AM Sb Leslie PTA [facility]   10/29/2024  7:00 AM Mile De La Rosa, PT [facility]   10/31/2024  7:00 AM Sb Leslie PTA [facility]

## 2024-10-10 ENCOUNTER — HOSPITAL ENCOUNTER (OUTPATIENT)
Facility: HOSPITAL | Age: 69
Setting detail: RECURRING SERIES
Discharge: HOME | End: 2024-10-13
Payer: MEDICARE

## 2024-10-10 PROCEDURE — 97110 THERAPEUTIC EXERCISES: CPT

## 2024-10-10 PROCEDURE — 97112 NEUROMUSCULAR REEDUCATION: CPT

## 2024-10-11 NOTE — PROGRESS NOTES
able to stabilize l/s and avoid lordosis. Updated HEP and pt ed on importance of good carryover to progress towards LTG's. On leaving pt indicates pain to L PSIS region and piriformis region.    Patient will continue to benefit from skilled PT / OT services to modify and progress therapeutic interventions, analyze and address functional mobility deficits, analyze and address ROM deficits, analyze and address strength deficits, and analyze and address soft tissue restrictions to address functional deficits and attain remaining goals.    Progress toward goals / Updated goals:  []  See Progress Note/Recertification    1. Pt will demonstrate 10 reps supine bridge to full ROM, pain-free to improve ease of bed mobility  Status at eval:  9/18:  Bridge: 25-50% with decreased hip ext noted on R vs L (p!)  Current: pt can perform 50% bridge with sensation of \"tightness\" at left quad 9/25/24; 10/10/24: Progressing, 75% AROM with \"tightness\" at left glute.  10/14: Goal in progress; increased reps; partial ROM and cues to avoid l/s lordosis  2. Pt will report at least 75% reduction in frequency/intensity of LLE radicular sx's with normal activities  Status at last note/certification: 9/8: Pt reports intermittent LLE tingling usually to plantar foot/lower lateral leg  Current:   10/4/24: Increased LBP and LLE tingling this visit  3. Pt will increase L glute med strength to at least 4/5 for improved pelvic stability with standing/walking.  Status at last note/certification: 9/18:  abd L 3/5 (p!)   Current:     4. Pt will demonstrate good l/s stability with supine/quadruped core stability exercises for improved axial stability/postural endurance with ADL's.  Status at last note/certification: 9/18: Good iso TA. Fair l/s stability with dead bug level 2  Current:     4. Pt will be independent with long term HEP for self management in preparation for d/c  Status at last note/certification: 9/18: pt reports unable to tolerate current

## 2024-10-14 ENCOUNTER — HOSPITAL ENCOUNTER (OUTPATIENT)
Facility: HOSPITAL | Age: 69
Setting detail: RECURRING SERIES
Discharge: HOME | End: 2024-10-17
Payer: MEDICARE

## 2024-10-14 PROCEDURE — 97112 NEUROMUSCULAR REEDUCATION: CPT

## 2024-10-14 PROCEDURE — 97110 THERAPEUTIC EXERCISES: CPT

## 2024-10-16 NOTE — THERAPY RECERTIFICATION
[facility]  [address] [place] [address]  Phone: [phone]   [phone]  PHYSICAL THERAPY PROGRESS NOTE  Patient Name: Emperatriz Sanderson : 1955   Treatment/Medical Diagnosis: Lumbago with sciatica, left side [M54.42]   Referral Source: Yolanda Mclaughlin APRN - NP     Date of Initial Visit: 24 Attended Visits: 14 Missed Visits: 1     SUMMARY OF TREATMENT  Physical therapy has consisted of therapeutic exercises for increased core/LE strength, ROM, and flexibility. Therapeutic activities performed to improve functional activities, model real life movements and performance specific to the patient's need with supervision to return the patient to their PLOF.  Neuromuscular Re-ed performed to improve coordination, improve mm activation, improve proprioception to improve the patient's ability to perform ADL/IADL's.  Manual therapy PRN for decreased muscle hypertonicity and increased ROM/flexibility. Pt education provided regarding HEP.     CURRENT STATUS  Pt reports 70% improvement since starting therapy. Pt has attended 14 skilled therapy visits since start of therapy and has missed 1 visit. Objective measurements indicate increased core and proximal hip strength. Pt demos improved L/S AROM but continues to report radicular sx down LLE but with decreased frequency. Pt can benefit from additional skilled therapy to increase functional strength and mobility and decrease pain for ease of ADL's and improved activity tolerance.    % improvement: 70%  Max pain 5/10  Avg pain 2-2.5  Min pain 1.5/10    Current improvements: Increased mobility, decreased pain, increased activity tolerance, increased stamina, increased standing tolerance  Remaining functional limitations: Radicular sx down LLE    Objective measures:  FUNCTIONAL ASSESSMENT:  TA iso Good               Bridge: 75% AROM with \"tightness\" at left glute     AROM:  LUMBAR: Flex fingertips to

## 2024-10-16 NOTE — PROGRESS NOTES
PHYSICAL / OCCUPATIONAL THERAPY - DAILY TREATMENT NOTE    Patient Name: Emperatriz Sanderson    Date: 10/17/2024    : 1955  Insurance: Payor: ELLIOT / Plan: NANO ZARATE FEDERAL / Product Type: *No Product type* /      Patient  verified Yes     Visit #   Current / Total 14 16   Time   In / Out 700 738   Pain   In / Out 1/10 1/10   Subjective Functional Status/Changes: Pt states back and hips feel good today. States new exercises added last visit were challenging but reports no pain.     TREATMENT AREA =  Lumbago with sciatica, left side [M54.42]     OBJECTIVE         Therapeutic Procedures:    Tx Min Billable or 1:1 Min (if diff from Tx Min) Procedure, Rationale, Specifics   28 28 80645 Therapeutic Activity (timed):  use of dynamic activities replicating functional movements to increase ROM, strength, coordination, balance, and proprioception in order to improve patient's ability to progress to PLOF and address remaining functional goals.  (see flow sheet as applicable)     Details if applicable:  Reasessment, pt education     10 10 65697 Therapeutic Exercise (timed):  increase ROM, strength, coordination, balance, and proprioception to improve patient's ability to progress to PLOF and address remaining functional goals. (see flow sheet as applicable)     Details if applicable:            Details if applicable:            Details if applicable:            Details if applicable:     38 38 [facility] Totals Reminder: bill using total billable min of TIMED therapeutic procedures (example: do not include dry needle or estim unattended, both untimed codes, in totals to left)  8-22 min = 1 unit; 23-37 min = 2 units; 38-52 min = 3 units; 53-67 min = 4 units; 68-82 min = 5 units   Total Total     [x]  Patient Education billed concurrently with other procedures   [x] Review HEP    [] Progressed/Changed HEP, detail:    [] Other detail:       Objective Information/Functional Measures/Assessment    See PN    Patient will continue to

## 2024-10-17 ENCOUNTER — HOSPITAL ENCOUNTER (OUTPATIENT)
Facility: HOSPITAL | Age: 69
Setting detail: RECURRING SERIES
Discharge: HOME | End: 2024-10-20
Payer: MEDICARE

## 2024-10-17 PROCEDURE — 97530 THERAPEUTIC ACTIVITIES: CPT

## 2024-10-17 PROCEDURE — 97110 THERAPEUTIC EXERCISES: CPT

## 2024-10-22 ENCOUNTER — HOSPITAL ENCOUNTER (OUTPATIENT)
Facility: HOSPITAL | Age: 69
Setting detail: RECURRING SERIES
Discharge: HOME | End: 2024-10-25
Payer: MEDICARE

## 2024-10-22 PROCEDURE — 97110 THERAPEUTIC EXERCISES: CPT

## 2024-10-22 PROCEDURE — 97112 NEUROMUSCULAR REEDUCATION: CPT

## 2024-10-22 PROCEDURE — 97530 THERAPEUTIC ACTIVITIES: CPT

## 2024-10-22 NOTE — PROGRESS NOTES
PHYSICAL / OCCUPATIONAL THERAPY - DAILY TREATMENT NOTE    Patient Name: Emperatriz Sanderson    Date: 10/22/2024    : 1955  Insurance: Payor: MEDICARE / Plan: MEDICARE PART A AND B / Product Type: *No Product type* /      Patient  verified Yes     Visit #   Current / Total 15    Time   In / Out 7:02 7:40   Pain   In / Out 1.5/10 0/10   Subjective Functional Status/Changes: Pt reports her leg is feeling much better today. States she has been wearing new insoles and has noticed an improvement. Requests to complete session by 7:40 2' meeting     TREATMENT AREA =  Lumbago with sciatica, left side [M54.42]     OBJECTIVE         Therapeutic Procedures:    Tx Min Billable or 1:1 Min (if diff from Tx Min) Procedure, Rationale, Specifics   15 15 62297 Therapeutic Exercise (timed):  increase ROM, strength, coordination, balance, and proprioception to improve patient's ability to progress to PLOF and address remaining functional goals. (see flow sheet as applicable)     Details if applicable:        Therapeutic Activity (timed):  use of dynamic activities replicating functional movements to increase ROM, strength, coordination, balance, and proprioception in order to improve patient's ability to progress to PLOF and address remaining functional goals.  (see flow sheet as applicable)     Details if applicable:     15 15 61851 Neuromuscular Re-Education (timed):  improve balance, coordination, kinesthetic sense, posture, core stability and proprioception to improve patient's ability to develop conscious control of individual muscles and awareness of position of extremities in order to progress to PLOF and address remaining functional goals. (see flow sheet as applicable)     Details if applicable:            Details if applicable:            Details if applicable:     38 38 [facility] Totals Reminder: bill using total billable min of TIMED therapeutic procedures (example: do not include dry needle or estim unattended,

## 2024-10-23 NOTE — PROGRESS NOTES
PHYSICAL / OCCUPATIONAL THERAPY - DAILY TREATMENT NOTE    Patient Name: Emperatriz Sanderson    Date: 10/24/2024    : 1955  Insurance: Payor: MEDICARE / Plan: MEDICARE PART A AND B / Product Type: *No Product type* /      Patient  verified Yes     Visit #   Current / Total 16 22   Time   In / Out 700 744   Pain   In / Out 1/10 0/10   Subjective Functional Status/Changes: Pt reports no increase in pain since last visit. Reports no pain down her leg this morning.     TREATMENT AREA =  Lumbago with sciatica, left side [M54.42]     OBJECTIVE         Therapeutic Procedures:    Tx Min Billable or 1:1 Min (if diff from Tx Min) Procedure, Rationale, Specifics   20  [id number] Therapeutic Exercise (timed):  increase ROM, strength, coordination, balance, and proprioception to improve patient's ability to progress to PLOF and address remaining functional goals. (see flow sheet as applicable)     Details if applicable:       10 10 72334 Therapeutic Activity (timed):  use of dynamic activities replicating functional movements to increase ROM, strength, coordination, balance, and proprioception in order to improve patient's ability to progress to PLOF and address remaining functional goals.  (see flow sheet as applicable)     Details if applicable:      64122 Neuromuscular Re-Education (timed):  improve balance, coordination, kinesthetic sense, posture, core stability and proprioception to improve patient's ability to develop conscious control of individual muscles and awareness of position of extremities in order to progress to PLOF and address remaining functional goals. (see flow sheet as applicable)     Details if applicable:            Details if applicable:            Details if applicable:     44 44 [facility] Totals Reminder: bill using total billable min of TIMED therapeutic procedures (example: do not include dry needle or estim unattended, both untimed codes, in totals to left)  8-22 min = 1 unit; 23-37 min = 2 units;

## 2024-10-24 ENCOUNTER — HOSPITAL ENCOUNTER (OUTPATIENT)
Facility: HOSPITAL | Age: 69
Setting detail: RECURRING SERIES
Discharge: HOME | End: 2024-10-27
Payer: MEDICARE

## 2024-10-24 PROCEDURE — 97112 NEUROMUSCULAR REEDUCATION: CPT

## 2024-10-24 PROCEDURE — 97530 THERAPEUTIC ACTIVITIES: CPT

## 2024-10-24 PROCEDURE — 97110 THERAPEUTIC EXERCISES: CPT

## 2024-10-29 ENCOUNTER — HOSPITAL ENCOUNTER (OUTPATIENT)
Facility: HOSPITAL | Age: 69
Setting detail: RECURRING SERIES
Discharge: HOME | End: 2024-11-01
Payer: MEDICARE

## 2024-10-29 PROCEDURE — 97110 THERAPEUTIC EXERCISES: CPT

## 2024-10-29 PROCEDURE — 97530 THERAPEUTIC ACTIVITIES: CPT

## 2024-10-29 PROCEDURE — 97112 NEUROMUSCULAR REEDUCATION: CPT

## 2024-10-31 ENCOUNTER — APPOINTMENT (OUTPATIENT)
Facility: HOSPITAL | Age: 69
End: 2024-10-31
Payer: MEDICARE

## 2024-11-14 NOTE — THERAPY DISCHARGE
PT DISCHARGE DAILY NOTE AND SUMMARY     If signature is requested please [facility]:    [facility]  Fax: [phone]    Date:11/15/2024  Patient name: Emperatriz Sanderson Start of Care: 2024   Referral source: Yolanda Mclaughlin APRN - NP : [id number]   Medical/Treatment Diagnosis: Lumbago with sciatica, left side [M54.42] Onset Date:~4 months prior to IE (2024)      Prior Hospitalization: see medical history Provider#: [id number]   Comorbidities: heart murmur, high cholesterol, HTN; currently L knee pain/instability   Prior Level of Function:Previously able to tolerate prolonged walking, standing, all ADLs without pain.   Insurance: Payor: MEDICARE / Plan: MEDICARE PART A AND B / Product Type: *No Product type* /      Visits from Start of Care: 18 Missed Visits: 1    Medicare: Reporting Period (date from last assessment to current assessment): 10/17/24-11/15/24    Patient  verified yes     Visit #   Current / Total 18 22   Time   In / Out 700 730   Pain   In / Out 0/10 010   Subjective Functional Status/Changes: Pt states she has not had any N/T in over a week. Pt would like to make today her last day of therapy.     TREATMENT AREA =  Lumbago with sciatica, left side [M54.42]      OBJECTIVE         Therapeutic Procedures:    Tx Min Billable or 1:1 Min (if diff from Tx Min) Procedure, Rationale, Specifics   22 22 [id number] Therapeutic Activity (timed):  use of dynamic activities replicating functional movements to increase ROM, strength, coordination, balance, and proprioception in order to improve patient's ability to progress to PLOF and address remaining functional goals.  (see flow sheet as applicable)     Details if applicable:  Reassessment     x x [phone] Therapeutic Exercise (timed):  increase ROM, strength, coordination, balance, and proprioception to improve patient's ability to progress to PLOF and address remaining functional goals. (see flow sheet as applicable)     Details if

## 2024-11-15 ENCOUNTER — HOSPITAL ENCOUNTER (OUTPATIENT)
Facility: HOSPITAL | Age: 69
Setting detail: RECURRING SERIES
Discharge: HOME | End: 2024-11-18
Payer: MEDICARE

## 2024-11-15 PROCEDURE — 97535 SELF CARE MNGMENT TRAINING: CPT

## 2024-11-15 PROCEDURE — 97530 THERAPEUTIC ACTIVITIES: CPT
